# Patient Record
Sex: FEMALE | Race: ASIAN | Employment: UNEMPLOYED | URBAN - METROPOLITAN AREA
[De-identification: names, ages, dates, MRNs, and addresses within clinical notes are randomized per-mention and may not be internally consistent; named-entity substitution may affect disease eponyms.]

---

## 2019-10-30 ENCOUNTER — HOSPITAL ENCOUNTER (OUTPATIENT)
Age: 19
Discharge: HOME | End: 2019-10-30
Payer: MEDICAID

## 2019-10-30 ENCOUNTER — HOSPITAL ENCOUNTER (OUTPATIENT)
Dept: GENERAL RADIOLOGY | Age: 19
Discharge: HOME | End: 2019-10-30
Payer: MEDICAID

## 2019-10-30 DIAGNOSIS — R52 PAIN: ICD-10-CM

## 2019-10-30 PROCEDURE — 71046 X-RAY EXAM CHEST 2 VIEWS: CPT

## 2020-06-08 ENCOUNTER — APPOINTMENT (OUTPATIENT)
Dept: GENERAL RADIOLOGY | Age: 20
End: 2020-06-08
Payer: COMMERCIAL

## 2020-06-08 ENCOUNTER — HOSPITAL ENCOUNTER (EMERGENCY)
Age: 20
Discharge: HOME | End: 2020-06-08
Attending: EMERGENCY MEDICINE
Payer: COMMERCIAL

## 2020-06-08 VITALS
RESPIRATION RATE: 16 BRPM | SYSTOLIC BLOOD PRESSURE: 131 MMHG | DIASTOLIC BLOOD PRESSURE: 83 MMHG | HEIGHT: 58 IN | HEART RATE: 88 BPM | BODY MASS INDEX: 16.37 KG/M2 | WEIGHT: 78 LBS | OXYGEN SATURATION: 99 % | TEMPERATURE: 97.9 F

## 2020-06-08 LAB
ANION GAP SERPL CALCULATED.3IONS-SCNC: 9 MMOL/L (ref 3–16)
BASOPHILS ABSOLUTE: 0 K/UL (ref 0–0.2)
BASOPHILS RELATIVE PERCENT: 0.6 %
BUN BLDV-MCNC: 7 MG/DL (ref 7–20)
CALCIUM SERPL-MCNC: 9.7 MG/DL (ref 8.3–10.6)
CHLORIDE BLD-SCNC: 103 MMOL/L (ref 99–110)
CO2: 26 MMOL/L (ref 21–32)
CREAT SERPL-MCNC: <0.5 MG/DL (ref 0.6–1.1)
D DIMER: <200 NG/ML DDU (ref 0–229)
EOSINOPHILS ABSOLUTE: 0.2 K/UL (ref 0–0.6)
EOSINOPHILS RELATIVE PERCENT: 3.9 %
GFR AFRICAN AMERICAN: >60
GFR NON-AFRICAN AMERICAN: >60
GLUCOSE BLD-MCNC: 88 MG/DL (ref 70–99)
HCT VFR BLD CALC: 41.4 % (ref 36–48)
HEMOGLOBIN: 14 G/DL (ref 12–16)
LYMPHOCYTES ABSOLUTE: 1.8 K/UL (ref 1–5.1)
LYMPHOCYTES RELATIVE PERCENT: 35.7 %
MCH RBC QN AUTO: 29.4 PG (ref 26–34)
MCHC RBC AUTO-ENTMCNC: 33.8 G/DL (ref 31–36)
MCV RBC AUTO: 87.1 FL (ref 80–100)
MONOCYTES ABSOLUTE: 0.3 K/UL (ref 0–1.3)
MONOCYTES RELATIVE PERCENT: 6.6 %
NEUTROPHILS ABSOLUTE: 2.7 K/UL (ref 1.7–7.7)
NEUTROPHILS RELATIVE PERCENT: 53.2 %
PDW BLD-RTO: 12.4 % (ref 12.4–15.4)
PLATELET # BLD: 220 K/UL (ref 135–450)
PMV BLD AUTO: 9.1 FL (ref 5–10.5)
POTASSIUM REFLEX MAGNESIUM: 3.9 MMOL/L (ref 3.5–5.1)
RBC # BLD: 4.76 M/UL (ref 4–5.2)
SODIUM BLD-SCNC: 138 MMOL/L (ref 136–145)
TROPONIN: <0.01 NG/ML
WBC # BLD: 5 K/UL (ref 4–11)

## 2020-06-08 PROCEDURE — U0003 INFECTIOUS AGENT DETECTION BY NUCLEIC ACID (DNA OR RNA); SEVERE ACUTE RESPIRATORY SYNDROME CORONAVIRUS 2 (SARS-COV-2) (CORONAVIRUS DISEASE [COVID-19]), AMPLIFIED PROBE TECHNIQUE, MAKING USE OF HIGH THROUGHPUT TECHNOLOGIES AS DESCRIBED BY CMS-2020-01-R: HCPCS

## 2020-06-08 PROCEDURE — 85025 COMPLETE CBC W/AUTO DIFF WBC: CPT

## 2020-06-08 PROCEDURE — 80048 BASIC METABOLIC PNL TOTAL CA: CPT

## 2020-06-08 PROCEDURE — 71045 X-RAY EXAM CHEST 1 VIEW: CPT

## 2020-06-08 PROCEDURE — 99283 EMERGENCY DEPT VISIT LOW MDM: CPT

## 2020-06-08 PROCEDURE — 36415 COLL VENOUS BLD VENIPUNCTURE: CPT

## 2020-06-08 PROCEDURE — 85379 FIBRIN DEGRADATION QUANT: CPT

## 2020-06-08 PROCEDURE — 93005 ELECTROCARDIOGRAM TRACING: CPT | Performed by: NURSE PRACTITIONER

## 2020-06-08 PROCEDURE — 84484 ASSAY OF TROPONIN QUANT: CPT

## 2020-06-08 RX ORDER — AZITHROMYCIN 250 MG/1
TABLET, FILM COATED ORAL
Qty: 1 PACKET | Refills: 0 | Status: SHIPPED | OUTPATIENT
Start: 2020-06-08 | End: 2020-06-12

## 2020-06-08 ASSESSMENT — ENCOUNTER SYMPTOMS
COUGH: 1
NAUSEA: 0
DIARRHEA: 0
SORE THROAT: 0
BLOOD IN STOOL: 0
VOMITING: 0
RHINORRHEA: 0
CONSTIPATION: 0
ABDOMINAL PAIN: 0
SHORTNESS OF BREATH: 0

## 2020-06-08 ASSESSMENT — PAIN SCALES - GENERAL: PAINLEVEL_OUTOF10: 6

## 2020-06-08 NOTE — ED PROVIDER NOTES
[facility]        Pt Name: Fatemeh Crow  MRN: [id number]  [place] 2000  Date of evaluation: 6/8/2020  Provider: WILIAN Boss CNP  PCP: No primary care provider on file. This patient was not seen and evaluated by the attending physician No att. providers found. CHIEF COMPLAINT       Chief Complaint   Patient presents with    Cough     Pt reporting cough with pain in the right side of her chest when she coughs. Pt also c/o bruise to left knee of unknown origin. HISTORY OF PRESENT ILLNESS   (Location/Symptom, Timing/Onset,Context/Setting, Quality, Duration, Modifying Factors, Severity)  Note limiting factors. Fatemeh Crow is a 23 y.o. female presents emergency department with concern for chest pain. She reports is been present for 3 days somewhat constantly. She is tried no over-the-counter prescribed remedies for symptoms. She reports cough to nursing but denies it to me. She also denies fever, rash, headaches, dizziness, shortness of breath, congestion, abdominal pain, nausea, vomiting, diarrhea, constipation, blood in the stool, or painful urination. No family at bedside. Nursing Notes triage note reviewed and agreed with or any disagreements were addressed  in the HPI. REVIEW OF SYSTEMS    (2-9 systems for level 4, 10 or more for level 5)     Review of Systems   Constitutional: Negative for chills and fever. HENT: Negative for postnasal drip, rhinorrhea and sore throat. Eyes: Negative for visual disturbance. Respiratory: Positive for cough. Negative for shortness of breath. Cardiovascular: Positive for chest pain. Gastrointestinal: Negative for abdominal pain, blood in stool, constipation, diarrhea, nausea and vomiting. Genitourinary: Negative for dysuria, flank pain and hematuria. Skin: Negative for rash. Neurological: Negative for weakness and headaches. etiologies are considered. Recommend radiographic follow-up after treatment in 2-4 weeks to ensure resolution. PROCEDURES   Unless otherwise noted below, none     Procedures    CRITICAL CARE TIME     n/a    CONSULTS:  None      EMERGENCY DEPARTMENT COURSE and DIFFERENTIAL DIAGNOSIS/MDM:   Vitals:    Vitals:    06/08/20 1626   BP: 131/83   Pulse: 88   Resp: 16   Temp: 97.9 °F (36.6 °C)   SpO2: 99%   Weight: 78 lb (35.4 kg)   Height: 4' 10\" (1.473 m)       Irene Castillo was given the following medications:  Medications - No data to display    Irene Castillo was evaluated in the emergency department with concern for chest pain. Offered medication in the ER but declined it. D-dimer and troponin are negative. EKG is nonischemic. Chest x-ray showed concerns for possible pneumonia. She will be treated as an outpatient for this. I doubt emergent intrathoracic process including but not limited to coronary ischemia, myocardial infarction, cardiac tamponade, pulmonary embolism, thoracic aortic dissection, significant pericarditis, pneumothorax, or acute abdomen. COVID swab sent. This is pending. There is no evidence of sepsis, meningitis, epiglottitis bacterial , acute bacterial sinusitis, streptococcal pharyngitis, otitis media, or other bacterial infection that would be managed with antibiotics. The patient is tolerating PO without difficulty and is in no acute distress on exam.  BS clear to ascultation. Supportive care recommended at this time and the patient can be managed as an outpatient. Strict return precautions given for changing or worsening pain, trouble swallowing or breathing, neck stiffness, fever, or rash. The patient was specifically advised their symptoms are consistent with possible COVID-19 infection, and they need to self-isolate at home and restrict any activities outside of their home except for seeking medical care, and to wear a facemask whenever around others.   The patient

## 2020-06-08 NOTE — ED PROVIDER NOTES
Note that I was asked by the [facility] to perform an EKG interpretation I did not dissipate in the care of this patient. Ekg: EKG interpretation by ED physician: Normal axis, normal sinus rhythm at a rate of 73 bpm. No ischemic ST changes.        Farnaz Guerra DO  06/08/20 1932

## 2020-06-09 ENCOUNTER — CARE COORDINATION (OUTPATIENT)
Dept: FAMILY MEDICINE CLINIC | Age: 20
End: 2020-06-09

## 2020-06-09 LAB
EKG ATRIAL RATE: 73 BPM
EKG DIAGNOSIS: NORMAL
EKG P AXIS: 48 DEGREES
EKG P-R INTERVAL: 136 MS
EKG Q-T INTERVAL: 378 MS
EKG QRS DURATION: 72 MS
EKG QTC CALCULATION (BAZETT): 416 MS
EKG R AXIS: 78 DEGREES
EKG T AXIS: 47 DEGREES
EKG VENTRICULAR RATE: 73 BPM

## 2020-06-09 PROCEDURE — 93010 ELECTROCARDIOGRAM REPORT: CPT | Performed by: INTERNAL MEDICINE

## 2020-06-09 NOTE — CARE COORDINATION
[phone]  Based on Loop alert triggers, patient will be contacted by nurse care manager for worsening symptoms. Pt will be further monitored by COVID Loop Team based on severity of symptoms and risk factors.

## 2020-06-11 LAB
SARS-COV-2: NOT DETECTED
SOURCE: NORMAL

## 2021-02-17 ENCOUNTER — OFFICE VISIT - HEALTHEAST (OUTPATIENT)
Dept: FAMILY MEDICINE | Facility: CLINIC | Age: 21
End: 2021-02-17

## 2021-02-17 DIAGNOSIS — M25.50 MULTIPLE JOINT PAIN: ICD-10-CM

## 2021-02-17 DIAGNOSIS — L29.9 ITCHING: ICD-10-CM

## 2021-02-17 LAB
ALBUMIN SERPL-MCNC: 4.6 G/DL (ref 3.5–5)
ALP SERPL-CCNC: 39 U/L (ref 45–120)
ALT SERPL W P-5'-P-CCNC: 11 U/L (ref 0–45)
ANION GAP SERPL CALCULATED.3IONS-SCNC: 9 MMOL/L (ref 5–18)
AST SERPL W P-5'-P-CCNC: 15 U/L (ref 0–40)
BASOPHILS # BLD AUTO: 0 THOU/UL (ref 0–0.2)
BASOPHILS NFR BLD AUTO: 1 % (ref 0–2)
BILIRUB SERPL-MCNC: 1.4 MG/DL (ref 0–1)
BUN SERPL-MCNC: 10 MG/DL (ref 8–22)
CALCIUM SERPL-MCNC: 9.8 MG/DL (ref 8.5–10.5)
CHLORIDE BLD-SCNC: 103 MMOL/L (ref 98–107)
CO2 SERPL-SCNC: 28 MMOL/L (ref 22–31)
CREAT SERPL-MCNC: 0.84 MG/DL (ref 0.6–1.1)
EOSINOPHIL # BLD AUTO: 0.2 THOU/UL (ref 0–0.4)
EOSINOPHIL NFR BLD AUTO: 3 % (ref 0–6)
ERYTHROCYTE [DISTWIDTH] IN BLOOD BY AUTOMATED COUNT: 12.1 % (ref 11–14.5)
ERYTHROCYTE [SEDIMENTATION RATE] IN BLOOD BY WESTERGREN METHOD: 5 MM/HR (ref 0–20)
GFR SERPL CREATININE-BSD FRML MDRD: >60 ML/MIN/1.73M2
GLUCOSE BLD-MCNC: 75 MG/DL (ref 70–125)
HCT VFR BLD AUTO: 40.4 % (ref 35–47)
HGB BLD-MCNC: 13.9 G/DL (ref 12–16)
IMM GRANULOCYTES # BLD: 0 THOU/UL
IMM GRANULOCYTES NFR BLD: 0 %
LYMPHOCYTES # BLD AUTO: 2 THOU/UL (ref 0.8–4.4)
LYMPHOCYTES NFR BLD AUTO: 34 % (ref 20–40)
MCH RBC QN AUTO: 30.3 PG (ref 27–34)
MCHC RBC AUTO-ENTMCNC: 34.4 G/DL (ref 32–36)
MCV RBC AUTO: 88 FL (ref 80–100)
MONOCYTES # BLD AUTO: 0.3 THOU/UL (ref 0–0.9)
MONOCYTES NFR BLD AUTO: 6 % (ref 2–10)
NEUTROPHILS # BLD AUTO: 3.4 THOU/UL (ref 2–7.7)
NEUTROPHILS NFR BLD AUTO: 57 % (ref 50–70)
PLATELET # BLD AUTO: 214 THOU/UL (ref 140–440)
PMV BLD AUTO: 12.8 FL (ref 7–10)
POTASSIUM BLD-SCNC: 4 MMOL/L (ref 3.5–5)
PROT SERPL-MCNC: 8.2 G/DL (ref 6–8)
RBC # BLD AUTO: 4.59 MILL/UL (ref 3.8–5.4)
SODIUM SERPL-SCNC: 140 MMOL/L (ref 136–145)
TSH SERPL DL<=0.005 MIU/L-ACNC: 1.53 UIU/ML (ref 0.3–5)
WBC: 6 THOU/UL (ref 4–11)

## 2021-02-22 ENCOUNTER — COMMUNICATION - HEALTHEAST (OUTPATIENT)
Dept: FAMILY MEDICINE | Facility: CLINIC | Age: 21
End: 2021-02-22

## 2021-02-22 DIAGNOSIS — L29.9 ITCHING: ICD-10-CM

## 2021-02-22 RX ORDER — HYDROXYZINE PAMOATE 25 MG/1
25-50 CAPSULE ORAL
Qty: 90 CAPSULE | Refills: 1 | Status: SHIPPED | OUTPATIENT
Start: 2021-02-22 | End: 2022-02-02

## 2021-03-23 ENCOUNTER — OFFICE VISIT - HEALTHEAST (OUTPATIENT)
Dept: INTERNAL MEDICINE | Facility: CLINIC | Age: 21
End: 2021-03-23

## 2021-03-23 DIAGNOSIS — M25.50 POLYARTHRALGIA: ICD-10-CM

## 2021-03-23 DIAGNOSIS — K58.1 IRRITABLE BOWEL SYNDROME WITH CONSTIPATION: ICD-10-CM

## 2021-03-23 RX ORDER — DICYCLOMINE HCL 20 MG
20 TABLET ORAL 3 TIMES DAILY PRN
Qty: 30 TABLET | Refills: 0 | Status: SHIPPED | OUTPATIENT
Start: 2021-03-23 | End: 2022-02-02

## 2021-03-23 RX ORDER — GABAPENTIN 100 MG/1
100 CAPSULE ORAL 2 TIMES DAILY
Qty: 60 CAPSULE | Refills: 3 | Status: SHIPPED | OUTPATIENT
Start: 2021-03-23 | End: 2022-02-02

## 2021-03-23 ASSESSMENT — MIFFLIN-ST. JEOR: SCORE: 1003.09

## 2021-06-05 VITALS
SYSTOLIC BLOOD PRESSURE: 98 MMHG | HEIGHT: 58 IN | BODY MASS INDEX: 16.58 KG/M2 | HEART RATE: 103 BPM | DIASTOLIC BLOOD PRESSURE: 66 MMHG | OXYGEN SATURATION: 100 % | WEIGHT: 79 LBS

## 2021-06-05 VITALS
BODY MASS INDEX: 16.93 KG/M2 | HEART RATE: 80 BPM | TEMPERATURE: 98.4 F | OXYGEN SATURATION: 100 % | WEIGHT: 81 LBS | SYSTOLIC BLOOD PRESSURE: 112 MMHG | DIASTOLIC BLOOD PRESSURE: 58 MMHG

## 2021-06-15 NOTE — TELEPHONE ENCOUNTER
----- Message from Lorenzo Machuca MD sent at 2/19/2021  2:51 PM CST -----  Please call patient :   Normal labs     Consider medication ( hydroxyzine ) for itching to take at night   If agreed will send in the prescription

## 2021-06-15 NOTE — PROGRESS NOTES
Assessment & Plan     Multiple joint pain  - Erythrocyte Sedimentation Rate  Normal     Discussed a trial of therapy  - Ambulatory referral to PT/OT      Itching  Mainly at night     Plan:   - Thyroid Stimulating Hormone (TSH)  Normal    - HM1(CBC and Differential)  - Comprehensive Metabolic Panel  Normal      Consider medication ( hydroxyzine ) for itching to take at night            No follow-ups on file.    Lorenzo Machuca MD  [facility] Prabhakar is 20 y.o. female with no significant past medical history presenting with her significant other for having multiple joint pain mainly involving the right elbow and posterior right knee with radiation down to her calf for the past 8 to 10 years, progressively worsening without previous evaluation.  She denies past injuries to the joints and notes to having random pain reoccurring 2-3 times per week which may last all day and Tylenol or ibuprofen not helping as much.    She is also notes to be feeling itchy mainly at nighttime for the past year, with  No past evaluation for this condition.          Objective    /58 (Patient Position: Sitting)   Pulse 80   Temp 98.4  F (36.9  C)   Wt (!) 81 lb (36.7 kg)   LMP 02/11/2021   SpO2 100%   BMI 16.93 kg/m    Body mass index is 16.93 kg/m .  Physical Exam  General Appearance:    Alert, well hydrated, no distress,    Eyes:    conjunctiva/corneas clear,    Throat:   Lips, mucosa, and tongue normal; teeth and gums normal   Neck:   Supple, symmetrical, trachea midline, no adenopathy;        thyroid:  No enlargement/tenderness/nodules;    Lungs:     Clear to auscultation bilaterally, respirations unlabored   Heart:    Regular rate and rhythm, S1 and S2 normal, no murmur, rub   or gallop   Abdomen:     Soft, non-tender, normal bowel sounds, no rebound or guarding, no masses, no organomegaly   MSK/ Extremities:  Normal to inspection and atraumatic, no edema  Right  elbow: Normal to inspection with no swelling or erythema, full range of motion, palpable tenderness to the anterior antecubital  Right knee exam: Normal to inspection with no swelling, palpable tenderness to the posterior joint   Skin:  Normal skin color with no rashes or lesions

## 2021-06-15 NOTE — PATIENT INSTRUCTIONS - HE
1. Multiple joint pain  - Ambulatory referral to PT/OT  You'll be called to schedule an appointment.   - Erythrocyte Sedimentation Rate    2. Itching  - Thyroid Stimulating Hormone (TSH)  - HM1(CBC and Differential)  - Comprehensive Metabolic Panel

## 2021-06-16 NOTE — PROGRESS NOTES
Office Visit - Follow Up   Mellissa Prabhakar   21 y.o. female    Date of Visit: 3/23/2021    Chief Complaint   Patient presents with     Follow-up     muscle ache X 1 week all over body        Assessment and Plan   1. Polyarthralgia  Has been having muscle pains and some joint pains. Suspect she has fibromyalgia. Will treat with gabapentin. Will refer to rheumatology for definitive diagnosis.   - gabapentin (NEURONTIN) 100 MG capsule; Take 100 mg by mouth 2 (two) times a day.  Dispense: 60 capsule; Refill: 3  - Ambulatory referral to Rheumatology - [facility]    2. Irritable bowel syndrome with constipation  Also suffers from abdominal pains with constipation. Suspect she has IBS. Will treat with dicyclomine as needed.   - dicyclomine (BENTYL) 20 mg tablet; Take 1 tablet (20 mg total) by mouth 3 (three) times a day as needed.  Dispense: 30 tablet; Refill: 0      Follow up in  4 weeks.      History of Present Illness   This 21 y.o. old female new to me but was seen in February for muscle and joint pains. Was worked up by another MD with normal sed rate, CMP, thyroid and CBC. Complains of almost daily aches and pains involving multiple areas mostly her muscles. Also complains of abdominal pains with constipation. Nothing seems to precipitate her abdominal pains. Eats plant based diet and tries to drink more fluids. She is young and healthy.      Review of Systems   A 12 point comprehensive review of systems was negative except as noted..     Medications, Allergies and Problem List   Reviewed and updated             Chief Complaint   Follow-up (muscle ache X 1 week all over body)       Patient Profile   Social History     Social History Narrative     Not on file        Past Medical History   There is no problem list on file for this patient.      Past Surgical History  She has no past surgical history on file.       Medications and Allergies   Current Outpatient Medications   Medication Sig     hydrOXYzine pamoate  "(VISTARIL) 25 MG capsule Take 1-2 capsules (25-50 mg total) by mouth at bedtime as needed for itching.     ibuprofen (ADVIL,MOTRIN) 600 MG tablet Take 1 tablet (600 mg total) by mouth every 6 (six) hours as needed for pain.     dicyclomine (BENTYL) 20 mg tablet Take 1 tablet (20 mg total) by mouth 3 (three) times a day as needed.     gabapentin (NEURONTIN) 100 MG capsule Take 100 mg by mouth 2 (two) times a day.     No Known Allergies     Family and Social History   No family history on file.     Social History     Tobacco Use     Smoking status: Never Smoker     Smokeless tobacco: Never Used   Substance Use Topics     Alcohol use: Not on file     Drug use: Not on file        Physical Exam       Physical Exam  BP 98/66   Pulse (!) 103   Ht 4' 10\" (1.473 m)   Wt (!) 79 lb (35.8 kg)   SpO2 100%   BMI 16.51 kg/m    General appearance: alert, appears stated age, cooperative and no distress  Throat: lips, mucosa, and tongue normal; teeth and gums normal  Neck: no adenopathy, no carotid bruit, no JVD, supple, symmetrical, trachea midline and thyroid not enlarged, symmetric, no tenderness/mass/nodules  Lungs: clear to auscultation bilaterally  Heart: regular rate and rhythm, S1, S2 normal, no murmur, click, rub or gallop  Abdomen: soft, non-tender; bowel sounds normal; no masses,  no organomegaly  Extremities: extremities normal, atraumatic, no cyanosis or edema     Additional Information   Post Discharge Medication Reconciliation Status: unable to reconcile discharge medications due to not taking meds      Anthony Joiner MD  Internal Medicine  Contact me at [phone]     Additional Information   Current Outpatient Medications   Medication Sig     hydrOXYzine pamoate (VISTARIL) 25 MG capsule Take 1-2 capsules (25-50 mg total) by mouth at bedtime as needed for itching.     ibuprofen (ADVIL,MOTRIN) 600 MG tablet Take 1 tablet (600 mg total) by mouth every 6 (six) hours as needed for pain.     dicyclomine (BENTYL) " 20 mg tablet Take 1 tablet (20 mg total) by mouth 3 (three) times a day as needed.     gabapentin (NEURONTIN) 100 MG capsule Take 100 mg by mouth 2 (two) times a day.     No Known Allergies  Social History     Tobacco Use     Smoking status: Never Smoker     Smokeless tobacco: Never Used   Substance Use Topics     Alcohol use: Not on file     Drug use: Not on file

## 2021-06-21 NOTE — LETTER
Letter by Lorenzo Machuca MD at      Author: Lorenzo Machuca MD Service: -- Author Type: --    Filed:  Encounter Date: 2/22/2021 Status: (Other)         Mellissa Prabhakar  [address] [place] [address]             February 22, 2021         Dear Ms. Prabhakar,    Below are the results from your recent visit:     Normal     Resulted Orders   Thyroid Stimulating Hormone (TSH)   Result Value Ref Range    TSH 1.53 0.30 - 5.00 uIU/mL   Comprehensive Metabolic Panel   Result Value Ref Range    Sodium 140 136 - 145 mmol/L    Potassium 4.0 3.5 - 5.0 mmol/L    Chloride 103 98 - 107 mmol/L    CO2 28 22 - 31 mmol/L    Anion Gap, Calculation 9 5 - 18 mmol/L    Glucose 75 70 - 125 mg/dL    BUN 10 8 - 22 mg/dL    Creatinine 0.84 0.60 - 1.10 mg/dL    GFR MDRD Af Amer >60 >60 mL/min/1.73m2    GFR MDRD Non Af Amer >60 >60 mL/min/1.73m2    Bilirubin, Total 1.4 (H) 0.0 - 1.0 mg/dL    Calcium 9.8 8.5 - 10.5 mg/dL    Protein, Total 8.2 (H) 6.0 - 8.0 g/dL    Albumin 4.6 3.5 - 5.0 g/dL    Alkaline Phosphatase 39 (L) 45 - 120 U/L    AST 15 0 - 40 U/L    ALT 11 0 - 45 U/L    Narrative    Fasting Glucose reference range is 70-99 mg/dL per  American Diabetes Association (ADA) guidelines.   Erythrocyte Sedimentation Rate   Result Value Ref Range    Sed Rate 5 0 - 20 mm/hr   HM1 (CBC with Diff)   Result Value Ref Range    WBC 6.0 4.0 - 11.0 thou/uL    RBC 4.59 3.80 - 5.40 mill/uL    Hemoglobin 13.9 12.0 - 16.0 g/dL    Hematocrit 40.4 35.0 - 47.0 %    MCV 88 80 - 100 fL    MCH 30.3 27.0 - 34.0 pg    MCHC 34.4 32.0 - 36.0 g/dL    RDW 12.1 11.0 - 14.5 %    Platelets 214 140 - 440 thou/uL    MPV 12.8 (H) 7.0 - 10.0 fL    Neutrophils % 57 50 - 70 %    Lymphocytes % 34 20 - 40 %    Monocytes % 6 2 - 10 %    Eosinophils % 3 0 - 6 %    Basophils % 1 0 - 2 %    Immature Granulocyte % 0 <=0 %    Neutrophils Absolute 3.4 2.0 - 7.7 thou/uL    Lymphocytes Absolute 2.0 0.8 - 4.4 thou/uL    Monocytes Absolute 0.3 0.0 - 0.9 thou/uL     Eosinophils Absolute 0.2 0.0 - 0.4 thou/uL    Basophils Absolute 0.0 0.0 - 0.2 thou/uL    Immature Granulocyte Absolute 0.0 <=0.0 thou/uL         Please call with questions or contact us using [employer].    Sincerely,        Electronically signed by Lorenzo Machuca MD

## 2021-06-21 NOTE — LETTER
Letter by Lorenzo Machuca MD at      Author: Lorenzo Machuca MD Service: -- Author Type: --    Filed:  Encounter Date: 2/22/2021 Status: (Other)         Mellissa Prabhakar  [address] [place] [address]             February 22, 2021         Dear Ms. Prabhakar,    Below are the results from your recent visit:   Normal    Resulted Orders   Thyroid Stimulating Hormone (TSH)   Result Value Ref Range    TSH 1.53 0.30 - 5.00 uIU/mL   Comprehensive Metabolic Panel   Result Value Ref Range    Sodium 140 136 - 145 mmol/L    Potassium 4.0 3.5 - 5.0 mmol/L    Chloride 103 98 - 107 mmol/L    CO2 28 22 - 31 mmol/L    Anion Gap, Calculation 9 5 - 18 mmol/L    Glucose 75 70 - 125 mg/dL    BUN 10 8 - 22 mg/dL    Creatinine 0.84 0.60 - 1.10 mg/dL    GFR MDRD Af Amer >60 >60 mL/min/1.73m2    GFR MDRD Non Af Amer >60 >60 mL/min/1.73m2    Bilirubin, Total 1.4 (H) 0.0 - 1.0 mg/dL    Calcium 9.8 8.5 - 10.5 mg/dL    Protein, Total 8.2 (H) 6.0 - 8.0 g/dL    Albumin 4.6 3.5 - 5.0 g/dL    Alkaline Phosphatase 39 (L) 45 - 120 U/L    AST 15 0 - 40 U/L    ALT 11 0 - 45 U/L    Narrative    Fasting Glucose reference range is 70-99 mg/dL per  American Diabetes Association (ADA) guidelines.   Erythrocyte Sedimentation Rate   Result Value Ref Range    Sed Rate 5 0 - 20 mm/hr   HM1 (CBC with Diff)   Result Value Ref Range    WBC 6.0 4.0 - 11.0 thou/uL    RBC 4.59 3.80 - 5.40 mill/uL    Hemoglobin 13.9 12.0 - 16.0 g/dL    Hematocrit 40.4 35.0 - 47.0 %    MCV 88 80 - 100 fL    MCH 30.3 27.0 - 34.0 pg    MCHC 34.4 32.0 - 36.0 g/dL    RDW 12.1 11.0 - 14.5 %    Platelets 214 140 - 440 thou/uL    MPV 12.8 (H) 7.0 - 10.0 fL    Neutrophils % 57 50 - 70 %    Lymphocytes % 34 20 - 40 %    Monocytes % 6 2 - 10 %    Eosinophils % 3 0 - 6 %    Basophils % 1 0 - 2 %    Immature Granulocyte % 0 <=0 %    Neutrophils Absolute 3.4 2.0 - 7.7 thou/uL    Lymphocytes Absolute 2.0 0.8 - 4.4 thou/uL    Monocytes Absolute 0.3 0.0 - 0.9 thou/uL     Eosinophils Absolute 0.2 0.0 - 0.4 thou/uL    Basophils Absolute 0.0 0.0 - 0.2 thou/uL    Immature Granulocyte Absolute 0.0 <=0.0 thou/uL           Please call with questions or contact us using [employer].    Sincerely,        Electronically signed by Lorenzo Machuca MD

## 2021-06-21 NOTE — LETTER
Letter by Lorenzo Machuca MD at      Author: Lorenzo Machuca MD Service: -- Author Type: --    Filed:  Encounter Date: 2/22/2021 Status: (Other)         Mellissa Prabhakar  [address]  [place] [address]             February 22, 2021         Dear Ms. Prabhakar,    Below are the results from your recent visit:   Normal labs     Resulted Orders   Thyroid Stimulating Hormone (TSH)   Result Value Ref Range    TSH 1.53 0.30 - 5.00 uIU/mL   Comprehensive Metabolic Panel   Result Value Ref Range    Sodium 140 136 - 145 mmol/L    Potassium 4.0 3.5 - 5.0 mmol/L    Chloride 103 98 - 107 mmol/L    CO2 28 22 - 31 mmol/L    Anion Gap, Calculation 9 5 - 18 mmol/L    Glucose 75 70 - 125 mg/dL    BUN 10 8 - 22 mg/dL    Creatinine 0.84 0.60 - 1.10 mg/dL    GFR MDRD Af Amer >60 >60 mL/min/1.73m2    GFR MDRD Non Af Amer >60 >60 mL/min/1.73m2    Bilirubin, Total 1.4 (H) 0.0 - 1.0 mg/dL    Calcium 9.8 8.5 - 10.5 mg/dL    Protein, Total 8.2 (H) 6.0 - 8.0 g/dL    Albumin 4.6 3.5 - 5.0 g/dL    Alkaline Phosphatase 39 (L) 45 - 120 U/L    AST 15 0 - 40 U/L    ALT 11 0 - 45 U/L    Narrative    Fasting Glucose reference range is 70-99 mg/dL per  American Diabetes Association (ADA) guidelines.   Erythrocyte Sedimentation Rate   Result Value Ref Range    Sed Rate 5 0 - 20 mm/hr   HM1 (CBC with Diff)   Result Value Ref Range    WBC 6.0 4.0 - 11.0 thou/uL    RBC 4.59 3.80 - 5.40 mill/uL    Hemoglobin 13.9 12.0 - 16.0 g/dL    Hematocrit 40.4 35.0 - 47.0 %    MCV 88 80 - 100 fL    MCH 30.3 27.0 - 34.0 pg    MCHC 34.4 32.0 - 36.0 g/dL    RDW 12.1 11.0 - 14.5 %    Platelets 214 140 - 440 thou/uL    MPV 12.8 (H) 7.0 - 10.0 fL    Neutrophils % 57 50 - 70 %    Lymphocytes % 34 20 - 40 %    Monocytes % 6 2 - 10 %    Eosinophils % 3 0 - 6 %    Basophils % 1 0 - 2 %    Immature Granulocyte % 0 <=0 %    Neutrophils Absolute 3.4 2.0 - 7.7 thou/uL    Lymphocytes Absolute 2.0 0.8 - 4.4 thou/uL    Monocytes Absolute 0.3 0.0 - 0.9 thou/uL     Eosinophils Absolute 0.2 0.0 - 0.4 thou/uL    Basophils Absolute 0.0 0.0 - 0.2 thou/uL    Immature Granulocyte Absolute 0.0 <=0.0 thou/uL           Please call with questions or contact us using [employer].    Sincerely,        Electronically signed by Lorenzo Machuca MD

## 2021-06-30 ENCOUNTER — OFFICE VISIT - HEALTHEAST (OUTPATIENT)
Dept: FAMILY MEDICINE | Facility: CLINIC | Age: 21
End: 2021-06-30

## 2021-06-30 DIAGNOSIS — M79.672 LEFT FOOT PAIN: ICD-10-CM

## 2021-06-30 DIAGNOSIS — M79.672 PAIN IN LEFT FOOT: ICD-10-CM

## 2021-06-30 DIAGNOSIS — M25.572 PAIN IN JOINT INVOLVING ANKLE AND FOOT, LEFT: ICD-10-CM

## 2021-06-30 DIAGNOSIS — M25.572 PAIN IN LEFT ANKLE AND JOINTS OF LEFT FOOT: ICD-10-CM

## 2021-07-04 NOTE — PATIENT INSTRUCTIONS - HE
Patient Instructions by Riley Lipscomb PA-C at 6/30/2021 12:15 PM     Author: Riley Lipscomb PA-C Service: -- Author Type: Physician Assistant    Filed: 6/30/2021  1:14 PM Encounter Date: 6/30/2021 Status: Addendum    : Riley Lipscomb PA-C (Physician Assistant)    Related Notes: Original Note by Riley Lipscomb PA-C (Physician Assistant) filed at 6/30/2021  1:14 PM       Nonweightbearing or protected weightbearing as needed  Elevate extremity above the level of the heart as much as possible especially on the first 2 days.  For the first 2 days ice the ankle 20 minutes on each hour while awake avoiding and taking precautions to damage the skin  Use of ankle height walking boot  You may be out of the boot for sleep and hygiene and when not up and active.  Session out of the boot after 1 week.  Over-the-counter ibuprofen 600 mg 3 times a day with food for analgesia and anti-inflammatory effect as long as there is no contraindication from the medications.  General risks and benefits of the medication were gone over  Full resolution will be over the next 4-6 weeks but there should be the worst symptoms and progression of healing should be over the first 10-12 days.  If inability to bear weight, worsening swelling or effusion of the joint or ecchymoses return to clinic and for evaluation with orthopedics for rexray in two weeks.          Patient Education     Understanding Ankle Sprain    The ankle is the joint where the leg and foot meet. Bones are held in place by connective tissue called ligaments. When ankle ligaments are stretched to the point of pain and injury, it's called an ankle sprain. A sprain can tear the ligaments. These tears can be very small but still cause pain. Ankle sprains are graded by the amount of ligament damage.    Grade 1 (mild). There is slight stretching and tiny tears to the ligament fibers. You may have mild ankle swelling and tenderness.    Grade 2 (moderate). This is a partial ligament  tear and causes moderate ankle swelling and tenderness. There may be abnormal looseness when the healthcare provider moves your joint.    Grade 3 (severe). There is a complete tear to the ligament and a great deal of ankle swelling and tenderness. The ankle joint may be very unstable.  What causes an ankle sprain?  A sprain may occur when you twist your ankle or bend it too far. This can happen when you stumble or fall. Things that can make an ankle sprain more likely include:    Having had an ankle sprain before    Playing sports that involve running and jumping. Or playing contact sports such as football or hockey.    Wearing shoes that dont support your feet and ankles well    Having ankles with poor strength and flexibility  Symptoms of an ankle sprain  Symptoms may include:    Pain or soreness in the ankle    Swelling    Redness or bruising    Not being able to walk or put weight on the affected foot    Reduced range of motion in the ankle    A popping or tearing feeling at the time the sprain occurs    An abnormal or dislocated look to the ankle    Instability or too much range of motion in the ankle  Treatment for an ankle sprain  Treatment focuses on reducing pain and swelling, and preventing further injury. Treatments may include:    Resting the ankle. Avoid putting weight on it. This may mean using crutches until the sprain heals.    Prescription or over-the-counter medicines. These help reduce swelling and pain.    Cold packs. These help reduce pain and swelling.    Raising your ankle above your heart. This helps reduce swelling.    Wrapping the ankle with an elastic bandage or ankle brace. This helps reduce swelling and gives some support to the ankle. In rare cases, you may need a cast or boot.    Stretching and other exercises. These improve flexibility and strength.    Heat packs. These may be recommended before doing ankle exercises.  Possible complications of an ankle sprain  An ankle that has been  weakened by a sprain can be more likely to have repeated sprains afterward. Doing exercises to strengthen your ankle and improve balance can reduce your risk for repeated sprains. Other possible complications are long-term (chronic) pain or an ankle that remains unstable.  When to call your healthcare provider  Call your healthcare provider right away if you have any of these:    Fever of 100.4 F (38 C) or higher, or as directed by your provider    Chills    Pain, numbness, discoloration, or coldness in the foot or toes    Pain that gets worse    Symptoms that dont get better, or get worse    New symptoms  Date Last Reviewed: 3/10/2016    7973-2266 [employer]. [address] [place] [address], [place] [address]. All rights reserved. This information is not intended as a substitute for professional medical care. Always follow your healthcare professional's instructions.           Patient Education     Treating Ankle Sprains  Treatment will depend on how bad your sprain is. For a severe sprain, healing may take 3 months or more.  Right after your injury: Use R.I.C.E.    BIG: Rest: At first, keep weight off the ankle as much as you can. You may be given crutches to help you walk without putting weight on the ankle.    BIG: Ice: Put an ice pack on the ankle for 20 minutes. Remove the pack and wait at least 30 minutes. Repeat for up to 3 days. This helps reduce swelling.    BIG: Compression: To reduce swelling and keep the joint stable, you may need to wrap the ankle with an elastic bandage. For more severe sprains, you may need an ankle brace, a boot, or a cast.    BIG: Elevation: To reduce swelling, keep your ankle raised above your heart when you sit or lie down.  Medicine  Your healthcare provider may suggest oral nonsteroidal anti-inflammatory medicine (NSAIDs), such as ibuprofen. This relieves the pain and helps reduce swelling. Be sure to take your medicine as directed.  Exercises    After about 2 to 3 weeks,  you may be given exercises to strengthen the ligaments and muscles in the ankle. Doing these exercises will help prevent another ankle sprain. Exercises may include standing on your toes and then on your heels and doing ankle curls.    Sit on the edge of a sturdy table or lie on your back.    Pull your toes toward you. Then point them away from you. Repeat for 2 to 3 minutes.  Date Last Reviewed: 1/1/2018 2000-2019 [employer] [address] [place], [place] [id number]. All rights reserved. This information is not intended as a substitute for professional medical care. Always follow your healthcare professional's instructions.

## 2021-07-04 NOTE — PROGRESS NOTES
Progress Notes by Riley Lipscomb PA-C at 6/30/2021 12:15 PM     Author: Riley Lipscomb PA-C Service: -- Author Type: Physician Assistant    Filed: 6/30/2021  2:29 PM Encounter Date: 6/30/2021 Status: Signed    : Riley Lipscomb PA-C (Physician Assistant)       Chief Complaint   Patient presents with   ? Fall     Fell 6/29/21, L ankle and foot pain, able to bear weight with pain         Clinical Decision Making:  Conversation with the patient stating I do think that she had problems with a contusion to the ankle or the lateral aspect of the foot and that there was a possible ankle sprain.  Use of the cam walker boot because the patient did not want to weight-bear on it as it was painful.  May transition out of the cam walker boot after 1 week.  Should be in the boot more than she is out of it the first week and then out of it after a week.  Out of the boot for sleep hygiene and when not up and active.  All suggestions and treatment plan and return in 2 weeks for audelia-ray and reevaluation if not getting 100% resolution or if new symptoms or concerns arise.    Patient requested a note to be off for work for 1 week.  Advised the patient that we can only write restrictions for what is safe for her to do at home and at work.  They have no work for her to do at her place of employment that is safe they may either have her sit on the job site or go home.  Patient will follow up with her primary care provider if any FMLA or disability paperwork is needed.  I informed her we do not fill that out through the urgent care.  She voiced understanding of that concern.    At the end of the encounter, I discussed results, diagnosis, medications. Discussed red flags for immediate return to clinic/ER, as well as indications for follow up if no improvement. Patient understood and agreed to plan. Patient was stable for discharge.      1. Left foot pain  XR Foot Left 2 VWS    Ankle/Foot DME: Walking Boot; Left; Non-pneumatic   2. Pain in  joint involving ankle and foot, left  XR Ankle Left 3 or More VWS    Ankle/Foot DME: Walking Boot; Left; Non-pneumatic         Patient Instructions   Nonweightbearing or protected weightbearing as needed  Elevate extremity above the level of the heart as much as possible especially on the first 2 days.  For the first 2 days ice the ankle 20 minutes on each hour while awake avoiding and taking precautions to damage the skin  Use of ankle height walking boot  You may be out of the boot for sleep and hygiene and when not up and active.  Session out of the boot after 1 week.  Over-the-counter ibuprofen 600 mg 3 times a day with food for analgesia and anti-inflammatory effect as long as there is no contraindication from the medications.  General risks and benefits of the medication were gone over  Full resolution will be over the next 4-6 weeks but there should be the worst symptoms and progression of healing should be over the first 10-12 days.  If inability to bear weight, worsening swelling or effusion of the joint or ecchymoses return to clinic and for evaluation with orthopedics for rexray in two weeks.          Patient Education     Understanding Ankle Sprain    The ankle is the joint where the leg and foot meet. Bones are held in place by connective tissue called ligaments. When ankle ligaments are stretched to the point of pain and injury, it's called an ankle sprain. A sprain can tear the ligaments. These tears can be very small but still cause pain. Ankle sprains are graded by the amount of ligament damage.    Grade 1 (mild). There is slight stretching and tiny tears to the ligament fibers. You may have mild ankle swelling and tenderness.    Grade 2 (moderate). This is a partial ligament tear and causes moderate ankle swelling and tenderness. There may be abnormal looseness when the healthcare provider moves your joint.    Grade 3 (severe). There is a complete tear to the ligament and a great deal of ankle  swelling and tenderness. The ankle joint may be very unstable.  What causes an ankle sprain?  A sprain may occur when you twist your ankle or bend it too far. This can happen when you stumble or fall. Things that can make an ankle sprain more likely include:    Having had an ankle sprain before    Playing sports that involve running and jumping. Or playing contact sports such as football or hockey.    Wearing shoes that dont support your feet and ankles well    Having ankles with poor strength and flexibility  Symptoms of an ankle sprain  Symptoms may include:    Pain or soreness in the ankle    Swelling    Redness or bruising    Not being able to walk or put weight on the affected foot    Reduced range of motion in the ankle    A popping or tearing feeling at the time the sprain occurs    An abnormal or dislocated look to the ankle    Instability or too much range of motion in the ankle  Treatment for an ankle sprain  Treatment focuses on reducing pain and swelling, and preventing further injury. Treatments may include:    Resting the ankle. Avoid putting weight on it. This may mean using crutches until the sprain heals.    Prescription or over-the-counter medicines. These help reduce swelling and pain.    Cold packs. These help reduce pain and swelling.    Raising your ankle above your heart. This helps reduce swelling.    Wrapping the ankle with an elastic bandage or ankle brace. This helps reduce swelling and gives some support to the ankle. In rare cases, you may need a cast or boot.    Stretching and other exercises. These improve flexibility and strength.    Heat packs. These may be recommended before doing ankle exercises.  Possible complications of an ankle sprain  An ankle that has been weakened by a sprain can be more likely to have repeated sprains afterward. Doing exercises to strengthen your ankle and improve balance can reduce your risk for repeated sprains. Other possible complications are long-term  (chronic) pain or an ankle that remains unstable.  When to call your healthcare provider  Call your healthcare provider right away if you have any of these:    Fever of 100.4 F (38 C) or higher, or as directed by your provider    Chills    Pain, numbness, discoloration, or coldness in the foot or toes    Pain that gets worse    Symptoms that dont get better, or get worse    New symptoms  Date Last Reviewed: 3/10/2016    5518-9546 [employer]. [address] [place] [address]. All rights reserved. This information is not intended as a substitute for professional medical care. Always follow your healthcare professional's instructions.           Patient Education     Treating Ankle Sprains  Treatment will depend on how bad your sprain is. For a severe sprain, healing may take 3 months or more.  Right after your injury: Use R.I.C.E.    BIG: Rest: At first, keep weight off the ankle as much as you can. You may be given crutches to help you walk without putting weight on the ankle.    BIG: Ice: Put an ice pack on the ankle for 20 minutes. Remove the pack and wait at least 30 minutes. Repeat for up to 3 days. This helps reduce swelling.    BIG: Compression: To reduce swelling and keep the joint stable, you may need to wrap the ankle with an elastic bandage. For more severe sprains, you may need an ankle brace, a boot, or a cast.    BIG: Elevation: To reduce swelling, keep your ankle raised above your heart when you sit or lie down.  Medicine  Your healthcare provider may suggest oral nonsteroidal anti-inflammatory medicine (NSAIDs), such as ibuprofen. This relieves the pain and helps reduce swelling. Be sure to take your medicine as directed.  Exercises    After about 2 to 3 weeks, you may be given exercises to strengthen the ligaments and muscles in the ankle. Doing these exercises will help prevent another ankle sprain. Exercises may include standing on your toes and then on your heels and doing  ankle curls.    Sit on the edge of a sturdy table or lie on your back.    Pull your toes toward you. Then point them away from you. Repeat for 2 to 3 minutes.  Date Last Reviewed: 1/1/2018 2000-2019 [employer]. [address] [place] [address]. All rights reserved. This information is not intended as a substitute for professional medical care. Always follow your healthcare professional's instructions.                HPI:  Mellissa Prabhakar is a 21 y.o. female who presents today for evaluation of left ankle and foot pain.  Patient shares that she fell from a standing height on 6/29/2021.  She hit the left lateral malleoli and the left lateral foot.  She had no break in the skin no ecchymoses no bleeding.  No joint effusion.  Says it is painful to bear weight.  However, the patient has been walking and did walk into the office encounter with an antalgic gait.  She has not tried treatment for this.  Specifically she denies any hip pain of the ipsilateral side contralateral right lower extremity or head neck back upper extremities or pelvic injury or pain to report.  She has not tried treatment for this at home.    History obtained from the patient.    Problem List:  There are no relevant problems documented for this patient.      No past medical history on file.    Social History     Tobacco Use   ? Smoking status: Never Smoker   ? Smokeless tobacco: Never Used   Substance Use Topics   ? Alcohol use: Not on file       Review of Systems  As above in HPI, otherwise balance of Review of Systems are negative.    Vitals:    06/30/21 1215   BP: 110/71   Patient Site: Right Arm   Patient Position: Sitting   Cuff Size: Adult Small   Pulse: 80   Temp: 98.3  F (36.8  C)   TempSrc: Oral   SpO2: 99%   Weight: (!) 76 lb 5 oz (34.6 kg)       Physical Exam    General: Patient is resting comfortably no acute distress is afebrile  HEENT: Head is normocephalic atraumatic   eyes are PERRL EOMI sclera  anicteric   Skin: Without rash non-diaphoretic  Musculoskeletal: Patient has point of maximal tenderness to palpation over the lateral malleoli of the distal fibula.  She also has some pain at the medial malleoli.  Positive squeeze test at the distal ankle.  No noted joint effusion ecchymoses or break in the skin.  She does have pain over the cuboid bone of the lateral tarsals and metatarsals.  No pain over Lisfranc ligament or plantar surface of the foot ecchymoses.  Patient is fully weightbearing and walking into the encounter but does have an antalgic gait.  Ankle joint is again without effusion the knee and hip are nontender palpation full range of motion without effusion on the left side.    Radiology:  I have personally ordered and preliminarily reviewed the following xray, I have noted no joint effusions or fractures.    Xr Ankle Left 3 Or More Vws    Result Date: 6/30/2021  EXAM DATE:         06/30/2021 EXAM: X-RAY ANKLE LEFT, MINIMUM THREE VIEWS, X-RAY FOOT LEFT, AP AND LATERAL LOCATION: [facility] DATE/TIME: 6/30/2021 12:45 PM INDICATION: Distal fibula pain with positive squeeze test COMPARISON: None. IMPRESSION: Normal joint spaces and alignment. No fracture of the foot or ankle.     Xr Foot Left 2 Vws    Result Date: 6/30/2021  EXAM DATE:         06/30/2021 EXAM: X-RAY ANKLE LEFT, MINIMUM THREE VIEWS, X-RAY FOOT LEFT, AP AND LATERAL LOCATION: [facility] DATE/TIME: 6/30/2021 12:45 PM INDICATION: Distal fibula pain with positive squeeze test COMPARISON: None. IMPRESSION: Normal joint spaces and alignment. No fracture of the foot or ankle.

## 2021-07-06 VITALS
WEIGHT: 76.31 LBS | OXYGEN SATURATION: 99 % | BODY MASS INDEX: 15.95 KG/M2 | DIASTOLIC BLOOD PRESSURE: 71 MMHG | HEART RATE: 80 BPM | TEMPERATURE: 98.3 F | SYSTOLIC BLOOD PRESSURE: 110 MMHG

## 2021-07-22 NOTE — LETTER
Letter by Riley Lipscomb PA-C at      Author: Riley Lipscomb PA-C Service: -- Author Type: --    Filed:  Encounter Date: 6/30/2021 Status: (Other)         June 30, 2021     Patient: Mellissa Prabhakar   YOB: 2000   Date of Visit: 6/30/2021       To Whom It May Concern:    It is my medical opinion that Mellissa Prabhakar may return to light duty immediately with the following restrictions: wear cam walker boot for one week. No crawling, climbing ladders for one week.  May return to full duty on July 7.      If you have any questions or concerns, please don't hesitate to call.    Sincerely,        Electronically signed by Riley Lipscomb PA-C

## 2021-07-22 NOTE — LETTER
Letter by Riley Lipscomb PA-C at      Author: Riley Lipscomb PA-C Service: -- Author Type: --    Filed:  Encounter Date: 6/30/2021 Status: (Other)         June 30, 2021     Patient: Mellissa Prabhakar   YOB: 2000   Date of Visit: 6/30/2021       To Whom It May Concern:    It is my medical opinion that Mellissa Prabhakar may return to work on 07/01/2021.    If you have any questions or concerns, please don't hesitate to call.    Sincerely,        Electronically signed by Riley Lipscomb PA-C

## 2021-10-13 ENCOUNTER — HOSPITAL ENCOUNTER (EMERGENCY)
Facility: HOSPITAL | Age: 21
Discharge: HOME | End: 2021-10-13
Attending: EMERGENCY MEDICINE | Admitting: EMERGENCY MEDICINE
Payer: COMMERCIAL

## 2021-10-13 VITALS
WEIGHT: 79 LBS | DIASTOLIC BLOOD PRESSURE: 88 MMHG | SYSTOLIC BLOOD PRESSURE: 122 MMHG | TEMPERATURE: 97.7 F | BODY MASS INDEX: 16.58 KG/M2 | HEIGHT: 58 IN | RESPIRATION RATE: 18 BRPM | HEART RATE: 93 BPM | OXYGEN SATURATION: 99 %

## 2021-10-13 DIAGNOSIS — B37.31 CANDIDIASIS OF VAGINA: ICD-10-CM

## 2021-10-13 DIAGNOSIS — B96.89 BACTERIAL VAGINOSIS: ICD-10-CM

## 2021-10-13 DIAGNOSIS — N76.0 BACTERIAL VAGINOSIS: ICD-10-CM

## 2021-10-13 LAB
ALBUMIN UR-MCNC: NEGATIVE MG/DL
APPEARANCE UR: CLEAR
BACTERIA #/AREA URNS HPF: ABNORMAL /HPF
BILIRUB UR QL STRIP: NEGATIVE
CLUE CELLS: PRESENT
COLOR UR AUTO: ABNORMAL
GLUCOSE UR STRIP-MCNC: NEGATIVE MG/DL
HGB UR QL STRIP: ABNORMAL
KETONES UR STRIP-MCNC: NEGATIVE MG/DL
LEUKOCYTE ESTERASE UR QL STRIP: ABNORMAL
NITRATE UR QL: NEGATIVE
PH UR STRIP: 6 [PH] (ref 5–7)
RBC URINE: 4 /HPF
SP GR UR STRIP: 1.01 (ref 1–1.03)
SQUAMOUS EPITHELIAL: 2 /HPF
TRICHOMONAS, WET PREP: ABNORMAL
UROBILINOGEN UR STRIP-MCNC: <2 MG/DL
WBC URINE: 7 /HPF
WBC'S/HIGH POWER FIELD, WET PREP: ABNORMAL
YEAST, WET PREP: PRESENT

## 2021-10-13 PROCEDURE — 81001 URINALYSIS AUTO W/SCOPE: CPT | Performed by: STUDENT IN AN ORGANIZED HEALTH CARE EDUCATION/TRAINING PROGRAM

## 2021-10-13 PROCEDURE — 81001 URINALYSIS AUTO W/SCOPE: CPT | Performed by: EMERGENCY MEDICINE

## 2021-10-13 PROCEDURE — 87210 SMEAR WET MOUNT SALINE/INK: CPT | Performed by: EMERGENCY MEDICINE

## 2021-10-13 PROCEDURE — 99284 EMERGENCY DEPT VISIT MOD MDM: CPT

## 2021-10-13 PROCEDURE — 87086 URINE CULTURE/COLONY COUNT: CPT | Performed by: EMERGENCY MEDICINE

## 2021-10-13 RX ORDER — CLOTRIMAZOLE 1 %
1 CREAM WITH APPLICATOR VAGINAL AT BEDTIME
Qty: 1 G | Refills: 0 | Status: SHIPPED | OUTPATIENT
Start: 2021-10-13 | End: 2021-10-20

## 2021-10-13 ASSESSMENT — MIFFLIN-ST. JEOR: SCORE: 1003.09

## 2021-10-13 NOTE — ED TRIAGE NOTES
Pt arrives with s/o. Pt c/o vaginal burning, itching and discharge x 3 days. Denies recent antibiotic use.

## 2021-10-14 LAB — BACTERIA UR CULT: NO GROWTH

## 2021-10-14 NOTE — ED PROVIDER NOTES
EMERGENCY DEPARTMENT ENCOUNTER            IMPRESSION:  Bacterial vaginosis  Vaginal candidiasis      MEDICAL DECISION MAKING:  Patient evaluated for vaginal discharge and itching.  She has very mild left pelvic discomfort.  No bleeding or pregnancy concerns    On exam her vital signs are normal.  Some slight tenderness with deep palpation of the left transabdominal adnexa    Wet prep showed presence of yeast and clue cells.    Patient will be discharged home with treatment for BV and yeast infection.          =================================================================  CHIEF COMPLAINT:  Chief Complaint   Patient presents with     Vaginal Itching     Dysuria         HPI  Mellissa Prabhakar is a 21 year old female with no pertinent history on record who presents to the ED by walk-in for evaluation of vaginal discharge and itching.    Patient reports vaginal discharge, itching, and burning which began 3 days ago. Also reports mild left lower quadrant abdominal pain for the past 3 days. She has been taking ibuprofen with mild relief. Patient denies nausea, vomiting, vaginal bleeding. She is not sexually active, per triage note. No other complaints or concerns expressed at this time.      I, Zoran May am serving as a scribe to document services personally performed by Dr. Jeremy Colbert MD, based on my observation and the provider's statements to me. I, Dr. Jeremy Colbert MD attest that Zoran May  is acting in a scribe capacity, has observed my performance of the services and has documented them in accordance with my direction.      REVIEW OF SYSTEMS   Constitutional: Denies fever, chills, unintentional weight loss or fatigue   Eyes: Denies visual changes or discharge    HENT: Denies sore throat, ear pain or neck pain  Respiratory: Denies cough or shortness of breath    Cardiovascular: Denies chest pain, palpitations or leg swelling  GI: Denies nausea, vomiting, or dark, bloody stools. Positive for left lower  abdominal pain.  : Denies hematuria, dysuria, flank pain, vaginal bleeding. Positive for vaginal discharge, vaginal itching.   Musculoskeletal: Denies any new back pain or new muscle/joint pains  Skin: Denies rash or wound  Neurologic: Denies current headache, new weakness, focal weakness, or sensory changes    Lymphatic: Denies swollen glands    Psychiatric: Denies depression, suicidal ideation or homicidal ideation.    Remainder of systems reviewed, unless noted in HPI all others negative.      PAST MEDICAL HISTORY:  History reviewed. No pertinent past medical history.    PAST SURGICAL HISTORY:  History reviewed. No pertinent surgical history.      CURRENT MEDICATIONS:    clindamycin (CLEOCIN 1 DOSE) 2 % vaginal cream  clotrimazole (LOTRIMIN) 1 % vaginal cream  dicyclomine (BENTYL) 20 mg tablet  gabapentin (NEURONTIN) 100 MG capsule  hydrocortisone 2.5 % cream  hydrOXYzine pamoate (VISTARIL) 25 MG capsule  ibuprofen (ADVIL,MOTRIN) 600 MG tablet        ALLERGIES:  No Known Allergies    FAMILY HISTORY:  History reviewed. No pertinent family history.    SOCIAL HISTORY:   Social History     Socioeconomic History     Marital status: Single     Spouse name: Not on file     Number of children: Not on file     Years of education: Not on file     Highest education level: Not on file   Occupational History     Not on file   Tobacco Use     Smoking status: Never Smoker     Smokeless tobacco: Never Used   Substance and Sexual Activity     Alcohol use: Not on file     Drug use: Not on file     Sexual activity: Not on file   Other Topics Concern     Not on file   Social History Narrative     Not on file     Social Determinants of Health     Financial Resource Strain:      Difficulty of Paying Living Expenses:    Food Insecurity:      Worried About Running Out of Food in the Last Year:      Ran Out of Food in the Last Year:    Transportation Needs:      Lack of Transportation (Medical):      Lack of Transportation  "(Non-Medical):    Physical Activity:      Days of Exercise per Week:      Minutes of Exercise per Session:    Stress:      Feeling of Stress :    Social Connections:      Frequency of Communication with Friends and Family:      Frequency of Social Gatherings with Friends and Family:      Attends Bahai Services:      Active Member of Clubs or Organizations:      Attends Club or Organization Meetings:      Marital Status:    Intimate Partner Violence:      Fear of Current or Ex-Partner:      Emotionally Abused:      Physically Abused:      Sexually Abused:        PHYSICAL EXAM:    /78   Pulse 100   Temp 97.7  F (36.5  C) (Temporal)   Resp 18   Ht 1.473 m (4' 10\")   Wt 35.8 kg (79 lb)   LMP 09/26/2021   SpO2 100%   BMI 16.51 kg/m      Constitutional: Awake, alert, in no acute distress  Respiratory:  no acute respiratory distress.  Cardiovascular: Regular rate and rhythm.  GI: Abdomen soft, non-tender to palpation in all 4 quadrants. No guarding or rebound. Bowel sounds intact on all 4 quadrants.  Some minimal left lower pelvic tenderness  Back: No CVA tenderness.      ED COURSE:    9:12 PM Met the patient and performed my initial exam. We discussed results and the plan for discharge. Patient is agreeable.     LAB:  All pertinent labs reviewed and interpreted.  Results for orders placed or performed during the hospital encounter of 10/13/21   UA with Microscopic reflex to Culture    Specimen: Urine, Clean Catch   Result Value Ref Range    Color Urine Light Yellow Colorless, Straw, Light Yellow, Yellow    Appearance Urine Clear Clear    Glucose Urine Negative Negative mg/dL    Bilirubin Urine Negative Negative    Ketones Urine Negative Negative mg/dL    Specific Gravity Urine 1.013 1.001 - 1.030    Blood Urine 0.03 mg/dL (A) Negative    pH Urine 6.0 5.0 - 7.0    Protein Albumin Urine Negative Negative mg/dL    Urobilinogen Urine <2.0 <2.0 mg/dL    Nitrite Urine Negative Negative    Leukocyte Esterase " Urine 500 Carie/uL (A) Negative    Bacteria Urine Few (A) None Seen /HPF    RBC Urine 4 (H) <=2 /HPF    WBC Urine 7 (H) <=5 /HPF    Squamous Epithelials Urine 2 (H) <=1 /HPF   Wet prep    Specimen: Vagina; Swab   Result Value Ref Range    Trichomonas Absent Absent    Yeast Present (A) Absent    Clue Cells Present (A) Absent    WBCs/high power field 1+ (A) None       RADIOLOGY:  Reviewed all pertinent imaging. Please see official radiology report.  No orders to display        MEDICATIONS GIVEN IN THE EMERGENCY:  Medications - No data to display        NEW PRESCRIPTIONS STARTED AT TODAY'S ER VISIT:  New Prescriptions    CLINDAMYCIN (CLEOCIN 1 DOSE) 2 % VAGINAL CREAM    BV    CLOTRIMAZOLE (LOTRIMIN) 1 % VAGINAL CREAM    Place 1 Applicatorful vaginally At Bedtime for 7 days For yeast          FINAL DIAGNOSIS:    ICD-10-CM    1. Bacterial vaginosis  N76.0     B96.89    2. Candidiasis of vagina  B37.3             At the conclusion of the encounter I discussed the results of all of the tests and the disposition. The questions were answered. The patient or family acknowledged understanding and was agreeable with the care plan.     NAME: Mellissa Prabhakar  AGE: 21 year old female  YOB: 2000  MRN: [id number]  EVALUATION DATE & TIME: 10/13/2021  8:52 PM    PCP: No Ref-Primary, Physician    ED PROVIDER: RAMAN Hayes Ethan Anderson, am serving as a scribe to document services personally performed by Dr. Jeremy Colbert based on my observation and the provider's statements to me. IJeremy MD attest that Zoran May is acting in a scribe capacity, has observed my performance of the services and has documented them in accordance with my direction.    Jeremy Colbert M.D.  Emergency Medicine  [facility] EMERGENCY DEPARTMENT  [address] [facility] [address] [id number] [phone]  Dept: [phone]  10/13/2021       Jeremy Colbert MD  10/13/21 [id number]

## 2021-10-14 NOTE — DISCHARGE INSTRUCTIONS
You are being treated for bacterial vaginosis and yeast infection.  You may apply both creams nightly.

## 2021-11-16 ENCOUNTER — OFFICE VISIT (OUTPATIENT)
Dept: FAMILY MEDICINE | Facility: CLINIC | Age: 21
End: 2021-11-16
Payer: COMMERCIAL

## 2021-11-16 VITALS
HEART RATE: 95 BPM | OXYGEN SATURATION: 100 % | TEMPERATURE: 98.5 F | SYSTOLIC BLOOD PRESSURE: 107 MMHG | DIASTOLIC BLOOD PRESSURE: 72 MMHG

## 2021-11-16 DIAGNOSIS — N89.8 VAGINAL ITCHING: Primary | ICD-10-CM

## 2021-11-16 LAB
CLUE CELLS: ABNORMAL
TRICHOMONAS, WET PREP: ABNORMAL
WBC'S/HIGH POWER FIELD, WET PREP: ABNORMAL
YEAST, WET PREP: ABNORMAL

## 2021-11-16 PROCEDURE — 99213 OFFICE O/P EST LOW 20 MIN: CPT | Performed by: PHYSICIAN ASSISTANT

## 2021-11-16 PROCEDURE — 87210 SMEAR WET MOUNT SALINE/INK: CPT

## 2021-11-16 RX ORDER — MICONAZOLE NITRATE 20 MG/G
CREAM TOPICAL 2 TIMES DAILY
Qty: 28 G | Refills: 0 | Status: SHIPPED | OUTPATIENT
Start: 2021-11-16 | End: 2021-11-23

## 2021-11-16 RX ORDER — FLUCONAZOLE 150 MG/1
150 TABLET ORAL ONCE
Qty: 2 TABLET | Refills: 0 | Status: SHIPPED | OUTPATIENT
Start: 2021-11-16 | End: 2021-11-16

## 2021-11-16 NOTE — PROGRESS NOTES
Patient presents with:  Vaginal Itching: vaginal itch last month. Pt states last month had similar symptoms, but symptoms did not go away. Vaginal itch inside and outside.       Clinical Decision Making: Patient experiencing ongoing vaginal itching previously treated for BV and yeast.  Wet prep is negative today, but symptoms sound most consistent with yeast infection.  Patient treated with oral Diflucan and external miconazole.  Patient will follow up if symptoms fail to improve.  Patient requesting medication to stimulate patient's appetite.  Recommend follow-up with primary care for further discussion.      ICD-10-CM    1. Vaginal itching  N89.8 Wet prep - Clinic Collect     fluconazole (DIFLUCAN) 150 MG tablet     miconazole (MICATIN) 2 % external cream       Patient Instructions   1.  Take Diflucan today.  If no improvement over the course the next 3 days take the second pill.  2.  Apply topical miconazole cream twice per day for the next 7 days.  3.  Follow-up if you have not had any symptom improvement over the course the next 6 days.  4.  I recommend scheduling an appointment with a primary care provider to discuss your low appetite.      HPI:  Mellissa Prabhakar is a 21 year old female who presents today complaining of vaginal itching going on for over a month.  Patient was previously seen on 10/13/2021.  At that time she had both yeast and bacterial vaginosis.  She was treated with clotrimazole vaginal cream and clindamycin vaginal cream.    History obtained from the patient.    Problem List:  There are no relevant problems documented for this patient.      No past medical history on file.    Social History     Tobacco Use     Smoking status: Never Smoker     Smokeless tobacco: Never Used   Substance Use Topics     Alcohol use: Not on file       Review of Systems    Vitals:    11/16/21 1454   BP: 107/72   BP Location: Right arm   Patient Position: Chair   Cuff Size: Adult Regular   Pulse: 95   Temp: 98.5   F (36.9  C)   TempSrc: Oral   SpO2: 100%       Physical Exam  Vitals and nursing note reviewed.   Constitutional:       General: She is not in acute distress.     Appearance: She is not toxic-appearing or diaphoretic.   HENT:      Head: Normocephalic and atraumatic.      Right Ear: External ear normal.      Left Ear: External ear normal.   Eyes:      Conjunctiva/sclera: Conjunctivae normal.   Pulmonary:      Effort: Pulmonary effort is normal. No respiratory distress.   Genitourinary:     Comments: No significant rash present that is visible on the labia, but patient states that the skin feels raw when it was examined.  White discharge noted at the opening of the introitus  Neurological:      Mental Status: She is alert.   Psychiatric:         Mood and Affect: Mood normal.         Behavior: Behavior normal.         Thought Content: Thought content normal.         Judgment: Judgment normal.         Labs:  Results for orders placed or performed in visit on 11/16/21   Wet prep - Clinic Collect     Status: Abnormal    Specimen: Vagina; Swab   Result Value Ref Range    Trichomonas Absent Absent    Yeast Absent Absent    Clue Cells Absent Absent    WBCs/high power field 1+ (A) None       At the end of the encounter, I discussed results, diagnosis, medications. Discussed red flags for immediate return to clinic/ER, as well as indications for follow up if no improvement. Patient understood and agreed to plan. Patient was stable for discharge.

## 2021-11-16 NOTE — PATIENT INSTRUCTIONS
1.  Take Diflucan today.  If no improvement over the course the next 3 days take the second pill.  2.  Apply topical miconazole cream twice per day for the next 7 days.  3.  Follow-up if you have not had any symptom improvement over the course the next 6 days.  4.  I recommend scheduling an appointment with a primary care provider to discuss your low appetite.

## 2021-12-14 NOTE — PROGRESS NOTES
"  Assessment & Plan   Problem List Items Addressed This Visit     None      Visit Diagnoses     Nausea    -  Primary    Relevant Orders    Helicobacter pylori Antigen Stool (Completed)    Burning sensation of feet        Relevant Medications    gabapentin (NEURONTIN) 100 MG capsule    Eczema, unspecified type        Relevant Orders    Adult Dermatology Referral           Will await lab results and treat accordingly.  Recommend avoiding offending foods, sitting with head of the bed elevated, certainly trial of over-the-counter PPI.  Recommend patient schedule follow-up with primary in 4 weeks, sooner if needed.             No follow-ups on file.    Dunia Lamar NP  [facility] NANCY Malloy is a 21 year old who presents for the following health issues     HPI     Burning in feet x 1 month    Nausea x 1 month       Review of Systems   Unremarkable listed above and below      Objective    /62 (BP Location: Right arm, Patient Position: Sitting, Cuff Size: Adult Small)   Pulse 88   Temp 98.4  F (36.9  C) (Temporal)   Ht 1.417 m (4' 7.8\")   Wt 34.2 kg (75 lb 8 oz)   LMP 12/15/2021 (Exact Date)   SpO2 100%   Breastfeeding No   BMI 17.05 kg/m    Body mass index is 17.05 kg/m .  Physical Exam   GENERAL: healthy, alert and no distress  RESP: lungs clear to auscultation - no rales, rhonchi or wheezes  MS: no gross musculoskeletal defects noted, no edema pulses intact, normal sensation 5.0 7 monofilament  SKIN: no suspicious lesions or rashes  PSYCH: mentation appears normal, affect normal/bright                "

## 2021-12-15 ENCOUNTER — OFFICE VISIT (OUTPATIENT)
Dept: INTERNAL MEDICINE | Facility: CLINIC | Age: 21
End: 2021-12-15
Payer: COMMERCIAL

## 2021-12-15 VITALS
HEIGHT: 56 IN | WEIGHT: 75.5 LBS | SYSTOLIC BLOOD PRESSURE: 100 MMHG | OXYGEN SATURATION: 100 % | DIASTOLIC BLOOD PRESSURE: 62 MMHG | TEMPERATURE: 98.4 F | HEART RATE: 88 BPM | BODY MASS INDEX: 16.99 KG/M2

## 2021-12-15 DIAGNOSIS — R20.8 BURNING SENSATION OF FEET: ICD-10-CM

## 2021-12-15 DIAGNOSIS — L30.9 ECZEMA, UNSPECIFIED TYPE: ICD-10-CM

## 2021-12-15 DIAGNOSIS — R11.0 NAUSEA: Primary | ICD-10-CM

## 2021-12-15 PROCEDURE — 99213 OFFICE O/P EST LOW 20 MIN: CPT | Performed by: NURSE PRACTITIONER

## 2021-12-15 RX ORDER — GABAPENTIN 100 MG/1
100 CAPSULE ORAL AT BEDTIME
Qty: 30 CAPSULE | Refills: 0 | Status: SHIPPED | OUTPATIENT
Start: 2021-12-15 | End: 2022-02-02

## 2021-12-15 ASSESSMENT — MIFFLIN-ST. JEOR: SCORE: 952.3

## 2021-12-21 PROCEDURE — 87338 HPYLORI STOOL AG IA: CPT | Performed by: NURSE PRACTITIONER

## 2021-12-23 LAB — H PYLORI AG STL QL IA: NEGATIVE

## 2022-01-18 ENCOUNTER — OFFICE VISIT (OUTPATIENT)
Dept: FAMILY MEDICINE | Facility: CLINIC | Age: 22
End: 2022-01-18
Payer: COMMERCIAL

## 2022-01-18 VITALS
SYSTOLIC BLOOD PRESSURE: 127 MMHG | OXYGEN SATURATION: 100 % | HEART RATE: 90 BPM | DIASTOLIC BLOOD PRESSURE: 78 MMHG | TEMPERATURE: 98.4 F

## 2022-01-18 DIAGNOSIS — Z3A.01 LESS THAN 8 WEEKS GESTATION OF PREGNANCY: Primary | ICD-10-CM

## 2022-01-18 LAB — HCG UR QL: POSITIVE

## 2022-01-18 PROCEDURE — 99213 OFFICE O/P EST LOW 20 MIN: CPT | Performed by: PHYSICIAN ASSISTANT

## 2022-01-18 PROCEDURE — 81025 URINE PREGNANCY TEST: CPT | Performed by: PHYSICIAN ASSISTANT

## 2022-01-18 NOTE — PROGRESS NOTES
URGENT CARE VISIT:    SUBJECTIVE:    Mellissa Prabhakar is a 21 year old female who presents today for a repeat pregnancy test. She had one positive at home pregnancy test. LMP was December 15. She denies abdominal pain or vaginal bleeding.    PMH: History reviewed. No pertinent past medical history.  Allergies: Patient has no known allergies.   Medications:   Current Outpatient Medications   Medication Sig Dispense Refill     clindamycin (CLEOCIN 1 DOSE) 2 % vaginal cream BV (Patient not taking: Reported on 11/16/2021) 5 g 0     dicyclomine (BENTYL) 20 mg tablet [DICYCLOMINE (BENTYL) 20 MG TABLET] Take 1 tablet (20 mg total) by mouth 3 (three) times a day as needed. (Patient not taking: Reported on 11/16/2021) 30 tablet 0     gabapentin (NEURONTIN) 100 MG capsule Take 1 capsule (100 mg) by mouth At Bedtime (Patient not taking: Reported on 1/18/2022) 30 capsule 0     gabapentin (NEURONTIN) 100 MG capsule [GABAPENTIN (NEURONTIN) 100 MG CAPSULE] Take 100 mg by mouth 2 (two) times a day. (Patient not taking: Reported on 11/16/2021) 60 capsule 3     hydrocortisone 2.5 % cream [HYDROCORTISONE 2.5 % CREAM] Apply topically 2 (two) times a day. (Patient not taking: Reported on 12/15/2021) 30 g 0     hydrOXYzine pamoate (VISTARIL) 25 MG capsule [HYDROXYZINE PAMOATE (VISTARIL) 25 MG CAPSULE] Take 1-2 capsules (25-50 mg total) by mouth at bedtime as needed for itching. (Patient not taking: Reported on 11/16/2021) 90 capsule 1     ibuprofen (ADVIL,MOTRIN) 600 MG tablet [IBUPROFEN (ADVIL,MOTRIN) 600 MG TABLET] Take 1 tablet (600 mg total) by mouth every 6 (six) hours as needed for pain. (Patient not taking: Reported on 12/15/2021) 21 tablet 0     Social History:   Social History     Tobacco Use     Smoking status: Never Smoker     Smokeless tobacco: Never Used   Substance Use Topics     Alcohol use: Not on file       ROS:  Review of systems negative except as stated above.    OBJECTIVE:  /78 (BP Location: Right arm,  Patient Position: Sitting, Cuff Size: Child)   Pulse 90   Temp 98.4  F (36.9  C) (Tympanic)   SpO2 100%   GENERAL APPEARANCE: healthy, alert and no distress  RESP: lungs clear to auscultation - no rales, rhonchi or wheezes  CV: regular rates and rhythm, normal S1 S2, no murmur noted  ABDOMEN:  soft, nontender, no HSM or masses and bowel sounds normal  BACK: No CVA tenderness  SKIN: no suspicious lesions or rashes    Labs:    Results for orders placed or performed in visit on 01/18/22   HCG Qual, Urine ([id number])     Status: Abnormal   Result Value Ref Range    hCG Urine Qualitative Positive (A) Negative       ASSESSMENT:     ICD-10-CM    1. Less than 8 weeks gestation of pregnancy  Z3A.01 HCG Qual, Urine ([id number])     HCG Qual, Urine ([id number])     Ob/Gyn Referral       PLAN:  Patient Instructions   Patient was informed of positive pregnancy test. She is very early in pregnancy. She was referred to OBGYN.. Seek emergency care if you develop severe abdominal/flank pain, or vaginal bleeding.    Patient verbalized understanding and is agreeable to plan. The patient was discharged ambulatory and in stable condition.    Denae Brewer PA-C on 1/18/2022 at 5:30 PM

## 2022-01-18 NOTE — PATIENT INSTRUCTIONS
Patient was informed of positive pregnancy test. She is very early in pregnancy. She was referred to OBGYN.. Seek emergency care if you develop severe abdominal/flank pain, or vaginal bleeding.

## 2022-01-29 ENCOUNTER — APPOINTMENT (OUTPATIENT)
Dept: ULTRASOUND IMAGING | Facility: HOSPITAL | Age: 22
End: 2022-01-29
Attending: EMERGENCY MEDICINE
Payer: COMMERCIAL

## 2022-01-29 ENCOUNTER — HOSPITAL ENCOUNTER (EMERGENCY)
Facility: HOSPITAL | Age: 22
Discharge: HOME | End: 2022-01-29
Attending: EMERGENCY MEDICINE | Admitting: EMERGENCY MEDICINE
Payer: COMMERCIAL

## 2022-01-29 VITALS
DIASTOLIC BLOOD PRESSURE: 72 MMHG | BODY MASS INDEX: 16.94 KG/M2 | HEART RATE: 87 BPM | OXYGEN SATURATION: 100 % | SYSTOLIC BLOOD PRESSURE: 115 MMHG | RESPIRATION RATE: 16 BRPM | WEIGHT: 75 LBS | TEMPERATURE: 99.3 F

## 2022-01-29 DIAGNOSIS — R11.0 NAUSEA: ICD-10-CM

## 2022-01-29 DIAGNOSIS — O20.0 THREATENED MISCARRIAGE IN EARLY PREGNANCY: ICD-10-CM

## 2022-01-29 LAB
ABO/RH(D): NORMAL
ALBUMIN SERPL-MCNC: 4.5 G/DL (ref 3.5–5)
ALBUMIN UR-MCNC: NEGATIVE MG/DL
ALP SERPL-CCNC: 28 U/L (ref 45–120)
ALT SERPL W P-5'-P-CCNC: 11 U/L (ref 0–45)
ANION GAP SERPL CALCULATED.3IONS-SCNC: 7 MMOL/L (ref 5–18)
APPEARANCE UR: CLEAR
AST SERPL W P-5'-P-CCNC: 13 U/L (ref 0–40)
BASOPHILS # BLD AUTO: 0 10E3/UL (ref 0–0.2)
BASOPHILS NFR BLD AUTO: 0 %
BILIRUB SERPL-MCNC: 1.1 MG/DL (ref 0–1)
BILIRUB UR QL STRIP: NEGATIVE
BUN SERPL-MCNC: 6 MG/DL (ref 8–22)
CALCIUM SERPL-MCNC: 9.4 MG/DL (ref 8.5–10.5)
CHLORIDE BLD-SCNC: 106 MMOL/L (ref 98–107)
CO2 SERPL-SCNC: 23 MMOL/L (ref 22–31)
COLOR UR AUTO: COLORLESS
CREAT SERPL-MCNC: 0.59 MG/DL (ref 0.6–1.1)
EOSINOPHIL # BLD AUTO: 0.1 10E3/UL (ref 0–0.7)
EOSINOPHIL NFR BLD AUTO: 2 %
ERYTHROCYTE [DISTWIDTH] IN BLOOD BY AUTOMATED COUNT: 12.2 % (ref 10–15)
GFR SERPL CREATININE-BSD FRML MDRD: >90 ML/MIN/1.73M2
GLUCOSE BLD-MCNC: 78 MG/DL (ref 70–125)
GLUCOSE UR STRIP-MCNC: NEGATIVE MG/DL
HCG SERPL-ACNC: ABNORMAL MLU/ML (ref 0–4)
HCT VFR BLD AUTO: 37.3 % (ref 35–47)
HGB BLD-MCNC: 12.7 G/DL (ref 11.7–15.7)
HGB UR QL STRIP: NEGATIVE
IMM GRANULOCYTES # BLD: 0 10E3/UL
IMM GRANULOCYTES NFR BLD: 0 %
KETONES UR STRIP-MCNC: NEGATIVE MG/DL
LEUKOCYTE ESTERASE UR QL STRIP: NEGATIVE
LYMPHOCYTES # BLD AUTO: 2.2 10E3/UL (ref 0.8–5.3)
LYMPHOCYTES NFR BLD AUTO: 32 %
MCH RBC QN AUTO: 31.1 PG (ref 26.5–33)
MCHC RBC AUTO-ENTMCNC: 34 G/DL (ref 31.5–36.5)
MCV RBC AUTO: 91 FL (ref 78–100)
MONOCYTES # BLD AUTO: 0.4 10E3/UL (ref 0–1.3)
MONOCYTES NFR BLD AUTO: 6 %
NEUTROPHILS # BLD AUTO: 4.2 10E3/UL (ref 1.6–8.3)
NEUTROPHILS NFR BLD AUTO: 60 %
NITRATE UR QL: NEGATIVE
NRBC # BLD AUTO: 0 10E3/UL
NRBC BLD AUTO-RTO: 0 /100
PH UR STRIP: 6 [PH] (ref 5–7)
PLATELET # BLD AUTO: 157 10E3/UL (ref 150–450)
POTASSIUM BLD-SCNC: 3.5 MMOL/L (ref 3.5–5)
PROT SERPL-MCNC: 7.6 G/DL (ref 6–8)
RBC # BLD AUTO: 4.09 10E6/UL (ref 3.8–5.2)
RBC URINE: <1 /HPF
SODIUM SERPL-SCNC: 136 MMOL/L (ref 136–145)
SP GR UR STRIP: 1.01 (ref 1–1.03)
SPECIMEN EXPIRATION DATE: NORMAL
SQUAMOUS EPITHELIAL: <1 /HPF
UROBILINOGEN UR STRIP-MCNC: <2 MG/DL
WBC # BLD AUTO: 6.9 10E3/UL (ref 4–11)
WBC URINE: <1 /HPF

## 2022-01-29 PROCEDURE — 76801 OB US < 14 WKS SINGLE FETUS: CPT

## 2022-01-29 PROCEDURE — 96361 HYDRATE IV INFUSION ADD-ON: CPT

## 2022-01-29 PROCEDURE — 80053 COMPREHEN METABOLIC PANEL: CPT | Performed by: STUDENT IN AN ORGANIZED HEALTH CARE EDUCATION/TRAINING PROGRAM

## 2022-01-29 PROCEDURE — 82040 ASSAY OF SERUM ALBUMIN: CPT | Performed by: STUDENT IN AN ORGANIZED HEALTH CARE EDUCATION/TRAINING PROGRAM

## 2022-01-29 PROCEDURE — 86901 BLOOD TYPING SEROLOGIC RH(D): CPT | Performed by: EMERGENCY MEDICINE

## 2022-01-29 PROCEDURE — 36415 COLL VENOUS BLD VENIPUNCTURE: CPT | Performed by: STUDENT IN AN ORGANIZED HEALTH CARE EDUCATION/TRAINING PROGRAM

## 2022-01-29 PROCEDURE — 85025 COMPLETE CBC W/AUTO DIFF WBC: CPT | Performed by: STUDENT IN AN ORGANIZED HEALTH CARE EDUCATION/TRAINING PROGRAM

## 2022-01-29 PROCEDURE — 81001 URINALYSIS AUTO W/SCOPE: CPT | Performed by: STUDENT IN AN ORGANIZED HEALTH CARE EDUCATION/TRAINING PROGRAM

## 2022-01-29 PROCEDURE — 96374 THER/PROPH/DIAG INJ IV PUSH: CPT

## 2022-01-29 PROCEDURE — 84702 CHORIONIC GONADOTROPIN TEST: CPT | Performed by: STUDENT IN AN ORGANIZED HEALTH CARE EDUCATION/TRAINING PROGRAM

## 2022-01-29 PROCEDURE — 99285 EMERGENCY DEPT VISIT HI MDM: CPT | Mod: 25

## 2022-01-29 PROCEDURE — 250N000011 HC RX IP 250 OP 636: Performed by: EMERGENCY MEDICINE

## 2022-01-29 PROCEDURE — 258N000003 HC RX IP 258 OP 636: Performed by: STUDENT IN AN ORGANIZED HEALTH CARE EDUCATION/TRAINING PROGRAM

## 2022-01-29 RX ORDER — ONDANSETRON 2 MG/ML
4 INJECTION INTRAMUSCULAR; INTRAVENOUS ONCE
Status: DISCONTINUED | OUTPATIENT
Start: 2022-01-29 | End: 2022-01-29

## 2022-01-29 RX ORDER — METOCLOPRAMIDE 5 MG/1
5 TABLET ORAL 3 TIMES DAILY PRN
Qty: 15 TABLET | Refills: 0 | Status: SHIPPED | OUTPATIENT
Start: 2022-01-29 | End: 2022-02-02

## 2022-01-29 RX ORDER — METOCLOPRAMIDE HYDROCHLORIDE 5 MG/ML
5 INJECTION INTRAMUSCULAR; INTRAVENOUS ONCE
Status: COMPLETED | OUTPATIENT
Start: 2022-01-29 | End: 2022-01-29

## 2022-01-29 RX ADMIN — SODIUM CHLORIDE, POTASSIUM CHLORIDE, SODIUM LACTATE AND CALCIUM CHLORIDE 1000 ML: 600; 310; 30; 20 INJECTION, SOLUTION INTRAVENOUS at 01:46

## 2022-01-29 RX ADMIN — METOCLOPRAMIDE HYDROCHLORIDE 5 MG: 5 INJECTION INTRAMUSCULAR; INTRAVENOUS at 01:45

## 2022-01-29 ASSESSMENT — ENCOUNTER SYMPTOMS
VOMITING: 0
NAUSEA: 1
APPETITE CHANGE: 0
ABDOMINAL PAIN: 1

## 2022-01-29 NOTE — DISCHARGE INSTRUCTIONS
As discussed in the ER your ultrasound showed an early pregnancy at 6 weeks, 2 days and could be concerning for either early pregnancy or possible miscarriage as cardiac activity was not seen yet.  Recommend recheck of hormone and ultrasound with OB as scheduled this coming week however if pain increases or bleeding starts return to the ER for ultrasound.

## 2022-01-29 NOTE — ED PROVIDER NOTES
NAME: Mellissa Prabhakar  AGE: 21 year old female  YOB: 2000  MRN: [id number]  EVALUATION DATE & TIME: No admission date for patient encounter.    PCP: Dunia Lamar    ED PROVIDER: Peyman Peck M.D.      Chief Complaint   Patient presents with     Nausea     FINAL IMPRESSION:  1. Nausea    2. Threatened miscarriage in early pregnancy    3. Subchorionic hemorrhage of placenta in first trimester, single or unspecified fetus      MEDICAL DECISION MAKIN:37 AM I met with the patient, obtained history, performed an initial exam, and discussed options and plan for diagnostics and treatment here in the ED.   3:33 AM I rechecked and updated patient.  4:36 AM I rechecked and updated patient on results. We discussed the plan for discharge and the patient is agreeable. Reviewed supportive cares, symptomatic treatment, outpatient follow up, and reasons to return to the Emergency Department. Patient to be discharged by ED RN.     Patient was clinically assessed and consented to treatment. After assessment, medical decision making and workup were discussed with the patient. The patient was agreeable to plan for testing, workup, and treatment.  Pertinent Labs & Imaging studies reviewed. (See chart for details)         Mellissa Prabhakar is a 21 year old female who presents with nausea.   Differential diagnosis includes but not limited to morning sickness, hyperemesis gravidarum, miscarriage, ectopic pregnancy, molar pregnancy.  Patient with nausea and just found she is pregnant 2 weeks ago.  Patient does not have abdominal pain or tenderness presently.  She does report some cramps at home recently but no vaginal bleeding or discharge.  She has no dysuria or urinary symptoms.  Patient's vital signs are stable and labs drawn from triage already.  Patient's beta-hCG would be consistent with pregnancy and hemoglobin stable.  Metabolic panel was otherwise unremarkable.  Urinalysis showed no signs of  infection.  Patient sent for ultrasound and awaiting ABO/Rh.  Ultrasound showed 6-week, 2-day pregnancy though no fetal heart tones seen.  Patient could be concerning for threatened miscarriage and fetal demise versus early pregnancy and cardiac activity is not seen.  Additionally there was a small subchorionic hemorrhage seen as well though she is not having any bleeding presently she may develop some given a small hemorrhage.  Patient was positive and would not require RhoGam.  I discussed the ultrasound with patient and concern for fetal demise and miscarriage possibly which is concerning.  Patient was treated with IV fluids and Reglan with good relief of nausea.  After discussion with patient she has an appoint with obstetrics in 4 to 5 days and I would recommend serial follow-up for check of hemoglobin and possibly repeat ultrasound.  Patient feels otherwise well now and will be cautioned regarding the threatened miscarriage or fetal demise and recommended for follow-up with her OB and is to call in 2 days for recheck of symptoms otherwise if she does develop bleeding or pain she is to return to the ER for recheck ultrasound over the weekend.  Patient comfortable with plan will be discharged.    0 minutes of critical care time    MEDICATIONS GIVEN IN THE EMERGENCY:  Medications   lactated ringers BOLUS 1,000 mL (0 mLs Intravenous Stopped 1/29/22 0315)   metoclopramide (REGLAN) injection 5 mg (5 mg Intravenous Given 1/29/22 0145)       NEW PRESCRIPTIONS STARTED AT TODAY'S ER VISIT:  Discharge Medication List as of 1/29/2022  4:46 AM      START taking these medications    Details   metoclopramide (REGLAN) 5 MG tablet Take 1 tablet (5 mg) by mouth 3 times daily as needed (nausea), Disp-15 tablet, R-0, Local Print                =================================================================    HPI    Patient information was obtained from: Patient    Use of : N/A         Mellissa Prabhakar is a 21  year old female with no past medical history who presents to the ED via walk-in for the evaluation of nausea.    Patient reports nausea for the past two days, which worsened yesterday. Patient denies any appetite changes but notes that nausea is worse with eating. She reports she took a pregnancy test two weeks ago which was positive and notes that her last menstrual period was 12/15/22. Patient states that this would be her first pregnancy. Otherwise, she notes some abdominal cramping. Denies any vomiting, vaginal bleeding, and significant medical history. No other complaints at this time.    Per triage note, patient has an appointment with OB on 2/2/22.    Per chart review, patient was seen on 1/1822 at [facility] for less than 8 weeks gestation of pregnancy. She presents for a repeat pregnancy test after having a positive at home pregnancy test. Patient was informed of positive pregnancy test. She is very early in pregnancy. She was referred to OBGYN. Patient discharged with return precautions.    REVIEW OF SYSTEMS   Review of Systems   Constitutional: Negative for appetite change.   Gastrointestinal: Positive for abdominal pain (cramping) and nausea. Negative for vomiting.   Genitourinary: Negative for vaginal bleeding.   All other systems reviewed and are negative.     PAST MEDICAL HISTORY:  History reviewed. No pertinent past medical history.    PAST SURGICAL HISTORY:  History reviewed. No pertinent surgical history.    CURRENT MEDICATIONS:    No current facility-administered medications for this encounter.    Current Outpatient Medications:      metoclopramide (REGLAN) 5 MG tablet, Take 1 tablet (5 mg) by mouth 3 times daily as needed (nausea), Disp: 15 tablet, Rfl: 0     clindamycin (CLEOCIN 1 DOSE) 2 % vaginal cream, BV (Patient not taking: Reported on 11/16/2021), Disp: 5 g, Rfl: 0     dicyclomine (BENTYL) 20 mg tablet, [DICYCLOMINE (BENTYL) 20 MG TABLET] Take 1 tablet (20 mg total)  by mouth 3 (three) times a day as needed. (Patient not taking: Reported on 11/16/2021), Disp: 30 tablet, Rfl: 0     gabapentin (NEURONTIN) 100 MG capsule, Take 1 capsule (100 mg) by mouth At Bedtime (Patient not taking: Reported on 1/18/2022), Disp: 30 capsule, Rfl: 0     gabapentin (NEURONTIN) 100 MG capsule, [GABAPENTIN (NEURONTIN) 100 MG CAPSULE] Take 100 mg by mouth 2 (two) times a day. (Patient not taking: Reported on 11/16/2021), Disp: 60 capsule, Rfl: 3     hydrocortisone 2.5 % cream, [HYDROCORTISONE 2.5 % CREAM] Apply topically 2 (two) times a day. (Patient not taking: Reported on 12/15/2021), Disp: 30 g, Rfl: 0     hydrOXYzine pamoate (VISTARIL) 25 MG capsule, [HYDROXYZINE PAMOATE (VISTARIL) 25 MG CAPSULE] Take 1-2 capsules (25-50 mg total) by mouth at bedtime as needed for itching. (Patient not taking: Reported on 11/16/2021), Disp: 90 capsule, Rfl: 1     ibuprofen (ADVIL,MOTRIN) 600 MG tablet, [IBUPROFEN (ADVIL,MOTRIN) 600 MG TABLET] Take 1 tablet (600 mg total) by mouth every 6 (six) hours as needed for pain. (Patient not taking: Reported on 12/15/2021), Disp: 21 tablet, Rfl: 0    ALLERGIES:  No Known Allergies    FAMILY HISTORY:  History reviewed. No pertinent family history.    SOCIAL HISTORY:   Social History     Socioeconomic History     Marital status: Single     Spouse name: Not on file     Number of children: Not on file     Years of education: Not on file     Highest education level: Not on file   Occupational History     Not on file   Tobacco Use     Smoking status: Never Smoker     Smokeless tobacco: Never Used   Substance and Sexual Activity     Alcohol use: Not on file     Drug use: Not on file     Sexual activity: Not on file   Other Topics Concern     Not on file   Social History Narrative     Not on file     Social Determinants of Health     Financial Resource Strain: Not on file   Food Insecurity: Not on file   Transportation Needs: Not on file   Physical Activity: Not on file   Stress:  Not on file   Social Connections: Not on file   Intimate Partner Violence: Not on file   Housing Stability: Not on file     PHYSICAL EXAM:    Vitals: /72   Pulse 87   Temp 99.3  F (37.4  C) (Temporal)   Resp 16   Wt 34 kg (75 lb)   LMP 12/15/2021   SpO2 100%   BMI 16.94 kg/m     Physical Exam  Vitals and nursing note reviewed.   Constitutional:       General: She is not in acute distress.     Appearance: Normal appearance. She is normal weight. She is not ill-appearing or toxic-appearing.   HENT:      Head: Normocephalic.   Cardiovascular:      Rate and Rhythm: Normal rate and regular rhythm.      Heart sounds: Normal heart sounds.   Pulmonary:      Effort: Pulmonary effort is normal. No respiratory distress.      Breath sounds: Normal breath sounds.   Abdominal:      General: There is no distension.      Palpations: Abdomen is soft.      Tenderness: There is no abdominal tenderness. There is no right CVA tenderness, left CVA tenderness or guarding.   Musculoskeletal:      Cervical back: Normal range of motion.   Skin:     General: Skin is warm and dry.      Coloration: Skin is not pale.   Neurological:      General: No focal deficit present.      Mental Status: She is alert.   Psychiatric:         Behavior: Behavior normal.        LAB:  All pertinent labs reviewed and interpreted.  Labs Ordered and Resulted from Time of ED Arrival to Time of ED Departure   COMPREHENSIVE METABOLIC PANEL - Abnormal       Result Value    Sodium 136      Potassium 3.5      Chloride 106      Carbon Dioxide (CO2) 23      Anion Gap 7      Urea Nitrogen 6 (*)     Creatinine 0.59 (*)     Calcium 9.4      Glucose 78      Alkaline Phosphatase 28 (*)     AST 13      ALT 11      Protein Total 7.6      Albumin 4.5      Bilirubin Total 1.1 (*)     GFR Estimate >90     HCG QUANTITATIVE PREGNANCY - Abnormal    hCG Quantitative 40,521 (*)    ROUTINE UA WITH MICROSCOPIC REFLEX TO CULTURE - Normal    Color Urine Colorless      Appearance  Urine Clear      Glucose Urine Negative      Bilirubin Urine Negative      Ketones Urine Negative      Specific Gravity Urine 1.007      Blood Urine Negative      pH Urine 6.0      Protein Albumin Urine Negative      Urobilinogen Urine <2.0      Nitrite Urine Negative      Leukocyte Esterase Urine Negative      RBC Urine <1      WBC Urine <1      Squamous Epithelials Urine <1     CBC WITH PLATELETS AND DIFFERENTIAL    WBC Count 6.9      RBC Count 4.09      Hemoglobin 12.7      Hematocrit 37.3      MCV 91      MCH 31.1      MCHC 34.0      RDW 12.2      Platelet Count 157      % Neutrophils 60      % Lymphocytes 32      % Monocytes 6      % Eosinophils 2      % Basophils 0      % Immature Granulocytes 0      NRBCs per 100 WBC 0      Absolute Neutrophils 4.2      Absolute Lymphocytes 2.2      Absolute Monocytes 0.4      Absolute Eosinophils 0.1      Absolute Basophils 0.0      Absolute Immature Granulocytes 0.0      Absolute NRBCs 0.0     ABO AND RH    ABO/RH(D) A POS      SPECIMEN EXPIRATION DATE [id number]       RADIOLOGY:  US OB < 14 Weeks Single   Final Result   IMPRESSION:    1. Single intrauterine pregnancy at 6 weeks 2 days estimated gestational age based upon a crown-rump length measurement of 0.5 cm on the study. No cardiac activity is detected within the embryo. This could be due to the early gestational age. Embryonic    demise is also possible. A follow-up ultrasound in one week could be useful for reevaluation.   2. Small subchorionic hemorrhage.         PROCEDURES:   Procedures       I, Samira Bowens, am serving as a scribe to document services personally performed by Dr. Peyman Peck  based on my observation and the provider's statements to me. I, Peyman Peck MD attest that Samira Bowens is acting in a scribe capacity, has observed my performance of the services and has documented them in accordance with my direction.      Peyman Peck M.D.  Emergency Medicine  [facility]  Emergency Department     Peyman Peck MD  01/29/22 9874

## 2022-01-29 NOTE — ED TRIAGE NOTES
Patient reports nausea without vomiting x 2 days.  States she recently found out she was pregnant- unsure if this is r/t per patient.  LMP 12/15/2021.  Has appointment with OB on Wednesday.

## 2022-01-30 ENCOUNTER — HEALTH MAINTENANCE LETTER (OUTPATIENT)
Age: 22
End: 2022-01-30

## 2022-02-01 NOTE — PROGRESS NOTES
"  Assessment:     , 7w0d  Nausea and cramping in early preg  Burping     Plan:   1. Discussed working Estimated Date of Delivery: 22. Will get repeat US to confirm dating and progression. Last US on 22 revealed gestation of 6w2d and did not show cardiac activity due to possible early gestation or embryonic demise. Recommended follow up for 1 week later. Referral given.   2. Encouraged vitamin B6 and Unisom for nausea. Discussed many relief measures for nausea. Can try OTC anti gas for burping/gas relief. Patient requests refill for Reglan, Rx provided for 15 more tablets, but encouraged to try other medications first.  3. UA/UC ordered and pending.  4. Oriented to HE CNM care; group and practice info reviewed.   5. 1st trimester teaching: encouraged well-balanced diet, pre- vitamins, vitamin D3 and omega 3 supplements, daily and walking for exercise. Discussed warning signs and when to call. Phone numbers given to patient for appointments and on call CNM.  6. She will schedule her initial OB visit at 10-12 wks gestation, depending on results of US.     Total time spent with patient 40 minutes, >50% counseling, education and coordination of care.   Subjective:     Mellissa is a 21 year old y.o.  who presents with partner \"Nobin\" for pregnancy confirmation. pregnancy is planned/desired. Trying for 6 months. Has concerns today of nausea, without vomiting, and burping. States that the burping has been almost all day every day for the last week. Wondering if this is just from the nausea/gaggy feeling. Some mild cramping, but denies any bleeding or discharge. She was given Reglan in the ED on , but this has not been helping with symptoms. Has been taking 3 times a day as prescribed and only has a few tablets left.  Contraception: None -- is she planning pregnancy? Yes.      Current symptoms also include: fatigue, nausea and positive home pregnancy test.     Patient's last menstrual period was " "12/15/2021.. She is certain of this date. Menstrual cycles are regular, occuring every 26-27 days.  Last period was normal.    Medications: Reglan and no PNV  See medical history section  See family history section  General health/lifestyle:   Patient states she does not always wear a seatbelt.  There are firearms in the home   There are working fire detectors in the home.  No smoking or tobacco use.   In a typical week, exercise includes: No except walking  Diet: Well-balanced until she started to feel poorly with nausea and burping  In a typical week she drinks 0 alcoholic drinks.  No recreational drug use.   No physical, sexual or emotional abuse.   She is in a safe, monogamous relationship.   Sexual history/family planning: Pregnant now  For birth control she uses: None  Patient is sexually active. In the last 12 months patient has had only one partner. Types of sexual activity practiced: N/A  Last menstrual period: 12/15/2021  PMS includes cramping and headaches.  Patient does not want STI testing today.  Understands that this will be a part of her eye OB visit  Review of systems: NA      Review of Systems  Denies bleeding, pain, abnormal vaginal discharge or dysuria.    Objective:     BP 96/58 (BP Location: Right arm, Patient Position: Sitting, Cuff Size: Adult Regular)   Pulse 112   Ht 1.422 m (4' 8\")   Wt 34.5 kg (76 lb)   LMP 12/15/2021   SpO2 99%   BMI 17.04 kg/m     General: alert and no acute distress      Lab Review  Urine HCG: positive previously, UA today looks contaminated, will send UC just in case.    Patient was seen with student, DARRYL Gonzalez who was present for learning. I personally assessed, examined and made clinical decisions reflected in the documentation.  Patricia FERNANDES CNM    "

## 2022-02-02 ENCOUNTER — OFFICE VISIT (OUTPATIENT)
Dept: MIDWIFE SERVICES | Facility: CLINIC | Age: 22
End: 2022-02-02
Payer: COMMERCIAL

## 2022-02-02 VITALS
DIASTOLIC BLOOD PRESSURE: 58 MMHG | HEART RATE: 112 BPM | BODY MASS INDEX: 17.09 KG/M2 | HEIGHT: 56 IN | OXYGEN SATURATION: 99 % | SYSTOLIC BLOOD PRESSURE: 96 MMHG | WEIGHT: 76 LBS

## 2022-02-02 DIAGNOSIS — Z3A.01 LESS THAN 8 WEEKS GESTATION OF PREGNANCY: ICD-10-CM

## 2022-02-02 LAB
ALBUMIN UR-MCNC: >=300 MG/DL
APPEARANCE UR: CLEAR
BACTERIA #/AREA URNS HPF: ABNORMAL /HPF
BILIRUB UR QL STRIP: NEGATIVE
COLOR UR AUTO: YELLOW
GLUCOSE UR STRIP-MCNC: NEGATIVE MG/DL
HGB UR QL STRIP: NEGATIVE
KETONES UR STRIP-MCNC: NEGATIVE MG/DL
LEUKOCYTE ESTERASE UR QL STRIP: ABNORMAL
NITRATE UR QL: NEGATIVE
PH UR STRIP: 7 [PH] (ref 5–8)
RBC #/AREA URNS AUTO: ABNORMAL /HPF
SP GR UR STRIP: 1.02 (ref 1–1.03)
SQUAMOUS #/AREA URNS AUTO: ABNORMAL /LPF
UROBILINOGEN UR STRIP-ACNC: 0.2 E.U./DL
WBC #/AREA URNS AUTO: ABNORMAL /HPF

## 2022-02-02 PROCEDURE — 99203 OFFICE O/P NEW LOW 30 MIN: CPT | Performed by: MIDWIFE

## 2022-02-02 PROCEDURE — 87086 URINE CULTURE/COLONY COUNT: CPT | Performed by: MIDWIFE

## 2022-02-02 PROCEDURE — 81001 URINALYSIS AUTO W/SCOPE: CPT | Performed by: MIDWIFE

## 2022-02-02 RX ORDER — METOCLOPRAMIDE 5 MG/1
5 TABLET ORAL 3 TIMES DAILY PRN
Qty: 15 TABLET | Refills: 0 | Status: SHIPPED | OUTPATIENT
Start: 2022-02-02

## 2022-02-02 ASSESSMENT — MIFFLIN-ST. JEOR: SCORE: 957.73

## 2022-02-03 LAB — BACTERIA UR CULT: NO GROWTH

## 2022-06-20 ENCOUNTER — OFFICE VISIT (OUTPATIENT)
Dept: DERMATOLOGY | Facility: CLINIC | Age: 22
End: 2022-06-20
Attending: NURSE PRACTITIONER
Payer: COMMERCIAL

## 2022-06-20 VITALS — SYSTOLIC BLOOD PRESSURE: 114 MMHG | DIASTOLIC BLOOD PRESSURE: 78 MMHG | HEART RATE: 95 BPM | OXYGEN SATURATION: 99 %

## 2022-06-20 DIAGNOSIS — L30.1 DYSHIDROTIC ECZEMA: Primary | ICD-10-CM

## 2022-06-20 DIAGNOSIS — L29.9 LOCALIZED PRURITUS: ICD-10-CM

## 2022-06-20 DIAGNOSIS — L30.9 ECZEMA, UNSPECIFIED TYPE: ICD-10-CM

## 2022-06-20 PROCEDURE — 99203 OFFICE O/P NEW LOW 30 MIN: CPT | Performed by: PHYSICIAN ASSISTANT

## 2022-06-20 RX ORDER — FLUOCINONIDE 0.5 MG/G
CREAM TOPICAL 2 TIMES DAILY
Qty: 60 G | Refills: 2 | Status: SHIPPED | OUTPATIENT
Start: 2022-06-20

## 2022-06-20 NOTE — PATIENT INSTRUCTIONS
Dyshidrotic eczema   Apply a thin layer of lidex to affected area 2x a day for 2 weeks. Tapering with improvement. Do not use on face or body folds.  Discussed side effects of topical steroids including but not limited to atrophy (skin thinning), striae (stretch marks) telangiectasias, steroid acne, and others. Do not apply to normal skin. Do not apply to discolored skin that does not have rash present. Educated patient on post inflammatory hyperpigmentation.     [email or web address]

## 2022-06-20 NOTE — PROGRESS NOTES
HPI:  I was asked to see pt by Dunia Lamar NP. Mellissa Prabhakar is a 22 year old female patient here today for rash on hands and feet .  Patient states this has been present for on and off for 10 years.  Patient reports the following symptoms: itchy bumps, worse with sweat .  Patient reports the following previous treatments: topicals with clearance but then flares. Pt is pregnant. Due in september.  Patient reports the following modifying factors: none.  Associated symptoms: none.  Patient has no other skin complaints today.  Remainder of the HPI, Meds, PMH, Allergies, FH, and SH was reviewed in chart.      No past medical history on file.    No past surgical history on file.     Family History   Problem Relation Age of Onset     No Known Problems Mother      No Known Problems Father      No Known Problems Maternal Grandmother      No Known Problems Maternal Grandfather      No Known Problems Paternal Grandmother      No Known Problems Paternal Grandfather      No Known Problems Brother      No Known Problems Brother        Social History     Socioeconomic History     Marital status: Single     Spouse name: Not on file     Number of children: Not on file     Years of education: Not on file     Highest education level: Not on file   Occupational History     Not on file   Tobacco Use     Smoking status: Never Smoker     Smokeless tobacco: Never Used   Substance and Sexual Activity     Alcohol use: Never     Drug use: Never     Sexual activity: Not on file   Other Topics Concern     Not on file   Social History Narrative     Not on file     Social Determinants of Health     Financial Resource Strain: Not on file   Food Insecurity: Not on file   Transportation Needs: Not on file   Physical Activity: Not on file   Stress: Not on file   Social Connections: Not on file   Intimate Partner Violence: Not on file   Housing Stability: Not on file       Outpatient Encounter Medications as of 6/20/2022   Medication Sig  Dispense Refill     metoclopramide (REGLAN) 5 MG tablet Take 1 tablet (5 mg) by mouth 3 times daily as needed (nausea) 15 tablet 0     No facility-administered encounter medications on file as of 6/20/2022.       Review Of Systems:  Skin: rash  Eyes: negative  Ears/Nose/Throat: negative  Respiratory: No shortness of breath, dyspnea on exertion, cough, or hemoptysis  Cardiovascular: negative  Gastrointestinal: negative  Genitourinary: negative  Musculoskeletal: negative  Neurologic: negative  Psychiatric: negative  Hematologic/Lymphatic/Immunologic: negative  Endocrine: negative      Objective:     LMP 12/15/2021   Eyes: Conjunctivae/lids: Normal   ENT: Lips:  Normal  MSK: Normal  Cardiovascular: Peripheral edema none  Pulm: Breathing Normal  Neuro/Psych: Orientation: A/O x 3. Normal; Mood/Affect: Normal, NAD, WDWN  Pt accompanied by: boyfriend  Following areas examined: face, neck, hands, and feet. Pt wearing mask.   Ross skin type:iv   Findings:  Clear vesicles 3rd interweb space of left hand, yellow brown smooth macules on left palmar hand, red papules on dorsal feet    Assessment and Plan:     1) dyshidrotic eczema, localized pruritis  Disc chronic etiology and course  Disc topical steroids and NBUVB while pregnant  Disc dupixent and methotrexate for after preg and breast feeding  Pt elects topicals    Apply a thin layer of lidex to affected area 2x a day for 2 weeks. Tapering with improvement. Do not use on face or body folds.  Discussed side effects of topical steroids including but not limited to atrophy (skin thinning), striae (stretch marks) telangiectasias, steroid acne, and others. Do not apply to normal skin. Do not apply to discolored skin that does not have rash present. Educated patient on post inflammatory hyperpigmentation.     Proper skin care from [facility]:    -Eliminate harsh soaps as they strip the natural oils from the skin, often resulting in dry itchy skin ( i.e. Dial, Zest,  E[place]ian Spring)  -Use mild soaps such as Cetaphil or Dove Sensitive Skin in the shower. You do not need to use soap on arms, legs, and trunk every time you shower unless visibly soiled.   -Avoid hot or cold showers.  -After showering, lightly dry off and apply moisturizing within 2-3 minutes. This will help trap moisture in the skin.   -Aggressive use of a moisturizer at least 1-2 times a day to the entire body (including -Vanicream, Cetaphil, Aquaphor or Cerave) and moisturize hands after every washing.  -We recommend using moisturizers that come in a tub that needs to be scooped out, not a pump. This has more of an oil base. It will hold moisture in your skin much better than a water base moisturizer. The above recommended are non-pore clogging.         It was a pleasure speaking with Mellissa Prabhakar today.       Follow up in next available

## 2022-06-20 NOTE — LETTER
6/20/2022         RE: Mellissa Prabhakar  [address]  [place] [address]        Dear Colleague,    Thank you for referring your patient, Mellissa Prabhakar, to the [facility]. Please see a copy of my visit note below.    HPI:  I was asked to see pt by Dunia Lamar NP. Mellissa Prabhakar is a 22 year old female patient here today for rash on hands and feet .  Patient states this has been present for on and off for 10 years.  Patient reports the following symptoms: itchy bumps, worse with sweat .  Patient reports the following previous treatments: topicals with clearance but then flares. Pt is pregnant. Due in september.  Patient reports the following modifying factors: none.  Associated symptoms: none.  Patient has no other skin complaints today.  Remainder of the HPI, Meds, PMH, Allergies, FH, and SH was reviewed in chart.      No past medical history on file.    No past surgical history on file.     Family History   Problem Relation Age of Onset     No Known Problems Mother      No Known Problems Father      No Known Problems Maternal Grandmother      No Known Problems Maternal Grandfather      No Known Problems Paternal Grandmother      No Known Problems Paternal Grandfather      No Known Problems Brother      No Known Problems Brother        Social History     Socioeconomic History     Marital status: Single     Spouse name: Not on file     Number of children: Not on file     Years of education: Not on file     Highest education level: Not on file   Occupational History     Not on file   Tobacco Use     Smoking status: Never Smoker     Smokeless tobacco: Never Used   Substance and Sexual Activity     Alcohol use: Never     Drug use: Never     Sexual activity: Not on file   Other Topics Concern     Not on file   Social History Narrative     Not on file     Social Determinants of Health     Financial Resource Strain: Not on file   Food Insecurity: Not on file    Transportation Needs: Not on file   Physical Activity: Not on file   Stress: Not on file   Social Connections: Not on file   Intimate Partner Violence: Not on file   Housing Stability: Not on file       Outpatient Encounter Medications as of 6/20/2022   Medication Sig Dispense Refill     metoclopramide (REGLAN) 5 MG tablet Take 1 tablet (5 mg) by mouth 3 times daily as needed (nausea) 15 tablet 0     No facility-administered encounter medications on file as of 6/20/2022.       Review Of Systems:  Skin: rash  Eyes: negative  Ears/Nose/Throat: negative  Respiratory: No shortness of breath, dyspnea on exertion, cough, or hemoptysis  Cardiovascular: negative  Gastrointestinal: negative  Genitourinary: negative  Musculoskeletal: negative  Neurologic: negative  Psychiatric: negative  Hematologic/Lymphatic/Immunologic: negative  Endocrine: negative      Objective:     LMP 12/15/2021   Eyes: Conjunctivae/lids: Normal   ENT: Lips:  Normal  MSK: Normal  Cardiovascular: Peripheral edema none  Pulm: Breathing Normal  Neuro/Psych: Orientation: A/O x 3. Normal; Mood/Affect: Normal, NAD, WDWN  Pt accompanied by: boyfriend  Following areas examined: face, neck, hands, and feet. Pt wearing mask.   Ross skin type:iv   Findings:  Clear vesicles 3rd interweb space of left hand, yellow brown smooth macules on left palmar hand, red papules on dorsal feet    Assessment and Plan:     1) dyshidrotic eczema, localized pruritis  Disc chronic etiology and course  Disc topical steroids and NBUVB while pregnant  Disc dupixent and methotrexate for after preg and breast feeding  Pt elects topicals    Apply a thin layer of lidex to affected area 2x a day for 2 weeks. Tapering with improvement. Do not use on face or body folds.  Discussed side effects of topical steroids including but not limited to atrophy (skin thinning), striae (stretch marks) telangiectasias, steroid acne, and others. Do not apply to normal skin. Do not apply to  discolored skin that does not have rash present. Educated patient on post inflammatory hyperpigmentation.     Proper skin care from [facility]:    -Eliminate harsh soaps as they strip the natural oils from the skin, often resulting in dry itchy skin ( i.e. Dial, Zest, Kiswahili Spring)  -Use mild soaps such as Cetaphil or Dove Sensitive Skin in the shower. You do not need to use soap on arms, legs, and trunk every time you shower unless visibly soiled.   -Avoid hot or cold showers.  -After showering, lightly dry off and apply moisturizing within 2-3 minutes. This will help trap moisture in the skin.   -Aggressive use of a moisturizer at least 1-2 times a day to the entire body (including -Vanicream, Cetaphil, Aquaphor or Cerave) and moisturize hands after every washing.  -We recommend using moisturizers that come in a tub that needs to be scooped out, not a pump. This has more of an oil base. It will hold moisture in your skin much better than a water base moisturizer. The above recommended are non-pore clogging.         It was a pleasure speaking with Mellissa Prabhakar today.       Follow up in next available        Again, thank you for allowing me to participate in the care of your patient.        Sincerely,        Edith Olmedo PA-C

## 2022-08-26 ENCOUNTER — LAB REQUISITION (OUTPATIENT)
Dept: LAB | Facility: CLINIC | Age: 22
End: 2022-08-26
Payer: COMMERCIAL

## 2022-08-26 DIAGNOSIS — Z36.85 ENCOUNTER FOR ANTENATAL SCREENING FOR STREPTOCOCCUS B: ICD-10-CM

## 2022-08-26 PROCEDURE — 87653 STREP B DNA AMP PROBE: CPT | Performed by: OBSTETRICS & GYNECOLOGY

## 2022-08-27 LAB — GP B STREP DNA SPEC QL NAA+PROBE: NEGATIVE

## 2022-09-02 ENCOUNTER — HOSPITAL ENCOUNTER (INPATIENT)
Facility: CLINIC | Age: 22
LOS: 3 days | Discharge: HOME | End: 2022-09-05
Attending: OBSTETRICS & GYNECOLOGY | Admitting: OBSTETRICS & GYNECOLOGY
Payer: COMMERCIAL

## 2022-09-02 DIAGNOSIS — Z37.9 NORMAL LABOR: Primary | ICD-10-CM

## 2022-09-02 PROBLEM — Z36.89 ENCOUNTER FOR TRIAGE IN PREGNANT PATIENT: Status: ACTIVE | Noted: 2022-09-02

## 2022-09-02 LAB
ABO/RH(D): NORMAL
ANTIBODY SCREEN: NEGATIVE
HGB BLD-MCNC: 13.6 G/DL (ref 11.7–15.7)
PLATELET # BLD AUTO: 164 10E3/UL (ref 150–450)
SARS-COV-2 RNA RESP QL NAA+PROBE: NEGATIVE
SPECIMEN EXPIRATION DATE: NORMAL
T PALLIDUM AB SER QL: NONREACTIVE

## 2022-09-02 PROCEDURE — 86850 RBC ANTIBODY SCREEN: CPT | Performed by: OBSTETRICS & GYNECOLOGY

## 2022-09-02 PROCEDURE — 86901 BLOOD TYPING SEROLOGIC RH(D): CPT | Performed by: OBSTETRICS & GYNECOLOGY

## 2022-09-02 PROCEDURE — 86780 TREPONEMA PALLIDUM: CPT | Performed by: OBSTETRICS & GYNECOLOGY

## 2022-09-02 PROCEDURE — U0003 INFECTIOUS AGENT DETECTION BY NUCLEIC ACID (DNA OR RNA); SEVERE ACUTE RESPIRATORY SYNDROME CORONAVIRUS 2 (SARS-COV-2) (CORONAVIRUS DISEASE [COVID-19]), AMPLIFIED PROBE TECHNIQUE, MAKING USE OF HIGH THROUGHPUT TECHNOLOGIES AS DESCRIBED BY CMS-2020-01-R: HCPCS | Performed by: OBSTETRICS & GYNECOLOGY

## 2022-09-02 PROCEDURE — 120N000001 HC R&B MED SURG/OB

## 2022-09-02 PROCEDURE — 36415 COLL VENOUS BLD VENIPUNCTURE: CPT | Performed by: OBSTETRICS & GYNECOLOGY

## 2022-09-02 PROCEDURE — 85049 AUTOMATED PLATELET COUNT: CPT | Performed by: OBSTETRICS & GYNECOLOGY

## 2022-09-02 PROCEDURE — 250N000011 HC RX IP 250 OP 636: Performed by: OBSTETRICS & GYNECOLOGY

## 2022-09-02 PROCEDURE — 250N000013 HC RX MED GY IP 250 OP 250 PS 637: Performed by: OBSTETRICS & GYNECOLOGY

## 2022-09-02 PROCEDURE — C9803 HOPD COVID-19 SPEC COLLECT: HCPCS

## 2022-09-02 PROCEDURE — 85018 HEMOGLOBIN: CPT | Performed by: OBSTETRICS & GYNECOLOGY

## 2022-09-02 RX ORDER — OXYTOCIN/0.9 % SODIUM CHLORIDE 30/500 ML
340 PLASTIC BAG, INJECTION (ML) INTRAVENOUS CONTINUOUS PRN
Status: DISCONTINUED | OUTPATIENT
Start: 2022-09-02 | End: 2022-09-03 | Stop reason: HOSPADM

## 2022-09-02 RX ORDER — LIDOCAINE 40 MG/G
CREAM TOPICAL
Status: DISCONTINUED | OUTPATIENT
Start: 2022-09-02 | End: 2022-09-02 | Stop reason: HOSPADM

## 2022-09-02 RX ORDER — MISOPROSTOL 100 UG/1
25 TABLET ORAL
Status: DISCONTINUED | OUTPATIENT
Start: 2022-09-02 | End: 2022-09-03 | Stop reason: HOSPADM

## 2022-09-02 RX ORDER — METHYLERGONOVINE MALEATE 0.2 MG/ML
200 INJECTION INTRAVENOUS
Status: DISCONTINUED | OUTPATIENT
Start: 2022-09-02 | End: 2022-09-03 | Stop reason: HOSPADM

## 2022-09-02 RX ORDER — ACETAMINOPHEN 325 MG/1
650 TABLET ORAL EVERY 4 HOURS PRN
Status: DISCONTINUED | OUTPATIENT
Start: 2022-09-02 | End: 2022-09-03 | Stop reason: HOSPADM

## 2022-09-02 RX ORDER — METOCLOPRAMIDE 10 MG/1
10 TABLET ORAL EVERY 6 HOURS PRN
Status: DISCONTINUED | OUTPATIENT
Start: 2022-09-02 | End: 2022-09-03 | Stop reason: HOSPADM

## 2022-09-02 RX ORDER — FENTANYL CITRATE 50 UG/ML
50 INJECTION, SOLUTION INTRAMUSCULAR; INTRAVENOUS EVERY 30 MIN PRN
Status: DISCONTINUED | OUTPATIENT
Start: 2022-09-02 | End: 2022-09-03 | Stop reason: HOSPADM

## 2022-09-02 RX ORDER — NALOXONE HYDROCHLORIDE 0.4 MG/ML
0.2 INJECTION, SOLUTION INTRAMUSCULAR; INTRAVENOUS; SUBCUTANEOUS
Status: DISCONTINUED | OUTPATIENT
Start: 2022-09-02 | End: 2022-09-03 | Stop reason: HOSPADM

## 2022-09-02 RX ORDER — CARBOPROST TROMETHAMINE 250 UG/ML
250 INJECTION, SOLUTION INTRAMUSCULAR
Status: DISCONTINUED | OUTPATIENT
Start: 2022-09-02 | End: 2022-09-03 | Stop reason: HOSPADM

## 2022-09-02 RX ORDER — MORPHINE SULFATE 10 MG/ML
5 INJECTION, SOLUTION INTRAMUSCULAR; INTRAVENOUS
Status: COMPLETED | OUTPATIENT
Start: 2022-09-02 | End: 2022-09-02

## 2022-09-02 RX ORDER — METOCLOPRAMIDE HYDROCHLORIDE 5 MG/ML
10 INJECTION INTRAMUSCULAR; INTRAVENOUS EVERY 6 HOURS PRN
Status: DISCONTINUED | OUTPATIENT
Start: 2022-09-02 | End: 2022-09-03 | Stop reason: HOSPADM

## 2022-09-02 RX ORDER — NALOXONE HYDROCHLORIDE 0.4 MG/ML
0.4 INJECTION, SOLUTION INTRAMUSCULAR; INTRAVENOUS; SUBCUTANEOUS
Status: DISCONTINUED | OUTPATIENT
Start: 2022-09-02 | End: 2022-09-03 | Stop reason: HOSPADM

## 2022-09-02 RX ORDER — OXYTOCIN 10 [USP'U]/ML
10 INJECTION, SOLUTION INTRAMUSCULAR; INTRAVENOUS
Status: DISCONTINUED | OUTPATIENT
Start: 2022-09-02 | End: 2022-09-03 | Stop reason: HOSPADM

## 2022-09-02 RX ORDER — TERBUTALINE SULFATE 1 MG/ML
0.25 INJECTION, SOLUTION SUBCUTANEOUS
Status: DISCONTINUED | OUTPATIENT
Start: 2022-09-02 | End: 2022-09-03 | Stop reason: HOSPADM

## 2022-09-02 RX ORDER — PROCHLORPERAZINE 25 MG
25 SUPPOSITORY, RECTAL RECTAL EVERY 12 HOURS PRN
Status: DISCONTINUED | OUTPATIENT
Start: 2022-09-02 | End: 2022-09-03 | Stop reason: HOSPADM

## 2022-09-02 RX ORDER — OXYTOCIN 10 [USP'U]/ML
10 INJECTION, SOLUTION INTRAMUSCULAR; INTRAVENOUS
Status: DISCONTINUED | OUTPATIENT
Start: 2022-09-02 | End: 2022-09-05 | Stop reason: HOSPADM

## 2022-09-02 RX ORDER — CITRIC ACID/SODIUM CITRATE 334-500MG
30 SOLUTION, ORAL ORAL
Status: DISCONTINUED | OUTPATIENT
Start: 2022-09-02 | End: 2022-09-03 | Stop reason: HOSPADM

## 2022-09-02 RX ORDER — ONDANSETRON 2 MG/ML
4 INJECTION INTRAMUSCULAR; INTRAVENOUS EVERY 6 HOURS PRN
Status: DISCONTINUED | OUTPATIENT
Start: 2022-09-02 | End: 2022-09-03 | Stop reason: HOSPADM

## 2022-09-02 RX ORDER — IBUPROFEN 800 MG/1
800 TABLET, FILM COATED ORAL
Status: DISCONTINUED | OUTPATIENT
Start: 2022-09-02 | End: 2022-09-05 | Stop reason: HOSPADM

## 2022-09-02 RX ORDER — PROCHLORPERAZINE MALEATE 10 MG
10 TABLET ORAL EVERY 6 HOURS PRN
Status: DISCONTINUED | OUTPATIENT
Start: 2022-09-02 | End: 2022-09-03 | Stop reason: HOSPADM

## 2022-09-02 RX ORDER — KETOROLAC TROMETHAMINE 30 MG/ML
15 INJECTION, SOLUTION INTRAMUSCULAR; INTRAVENOUS
Status: DISCONTINUED | OUTPATIENT
Start: 2022-09-02 | End: 2022-09-05 | Stop reason: HOSPADM

## 2022-09-02 RX ORDER — CITRIC ACID/SODIUM CITRATE 334-500MG
30 SOLUTION, ORAL ORAL ONCE
Status: DISCONTINUED | OUTPATIENT
Start: 2022-09-02 | End: 2022-09-03 | Stop reason: HOSPADM

## 2022-09-02 RX ORDER — MISOPROSTOL 200 UG/1
800 TABLET ORAL
Status: DISCONTINUED | OUTPATIENT
Start: 2022-09-02 | End: 2022-09-03 | Stop reason: HOSPADM

## 2022-09-02 RX ORDER — OXYTOCIN/0.9 % SODIUM CHLORIDE 30/500 ML
100-340 PLASTIC BAG, INJECTION (ML) INTRAVENOUS CONTINUOUS PRN
Status: DISCONTINUED | OUTPATIENT
Start: 2022-09-02 | End: 2022-09-05 | Stop reason: HOSPADM

## 2022-09-02 RX ORDER — ONDANSETRON 4 MG/1
4 TABLET, ORALLY DISINTEGRATING ORAL EVERY 6 HOURS PRN
Status: DISCONTINUED | OUTPATIENT
Start: 2022-09-02 | End: 2022-09-03 | Stop reason: HOSPADM

## 2022-09-02 RX ORDER — HYDROXYZINE HYDROCHLORIDE 25 MG/1
50 TABLET, FILM COATED ORAL
Status: DISCONTINUED | OUTPATIENT
Start: 2022-09-02 | End: 2022-09-03 | Stop reason: HOSPADM

## 2022-09-02 RX ORDER — MISOPROSTOL 200 UG/1
400 TABLET ORAL
Status: DISCONTINUED | OUTPATIENT
Start: 2022-09-02 | End: 2022-09-03 | Stop reason: HOSPADM

## 2022-09-02 RX ADMIN — MISOPROSTOL 25 MCG: 100 TABLET ORAL at 23:00

## 2022-09-02 RX ADMIN — MISOPROSTOL 25 MCG: 100 TABLET ORAL at 18:58

## 2022-09-02 RX ADMIN — MORPHINE SULFATE 5 MG: 10 INJECTION INTRAVENOUS at 22:04

## 2022-09-02 RX ADMIN — MISOPROSTOL 25 MCG: 100 TABLET ORAL at 16:53

## 2022-09-02 RX ADMIN — HYDROXYZINE HYDROCHLORIDE 50 MG: 25 TABLET ORAL at 21:11

## 2022-09-02 RX ADMIN — MISOPROSTOL 25 MCG: 100 TABLET ORAL at 21:08

## 2022-09-02 ASSESSMENT — ACTIVITIES OF DAILY LIVING (ADL)
WALKING_OR_CLIMBING_STAIRS_DIFFICULTY: NO
ADLS_ACUITY_SCORE: 18
ADLS_ACUITY_SCORE: 18
CONCENTRATING,_REMEMBERING_OR_MAKING_DECISIONS_DIFFICULTY: NO
CHANGE_IN_FUNCTIONAL_STATUS_SINCE_ONSET_OF_CURRENT_ILLNESS/INJURY: NO
DIFFICULTY_COMMUNICATING: NO
FALL_HISTORY_WITHIN_LAST_SIX_MONTHS: NO
WEAR_GLASSES_OR_BLIND: NO
ADLS_ACUITY_SCORE: 18
HEARING_DIFFICULTY_OR_DEAF: NO
ADLS_ACUITY_SCORE: 18
DRESSING/BATHING_DIFFICULTY: NO
DOING_ERRANDS_INDEPENDENTLY_DIFFICULTY: NO
ADLS_ACUITY_SCORE: 18
TOILETING_ISSUES: NO
ADLS_ACUITY_SCORE: 18
DIFFICULTY_EATING/SWALLOWING: NO

## 2022-09-02 NOTE — H&P
[facility] Labor and Delivery History and Physical    Mellissa Prabhakar MRN# [id number]   Age: 22 year old YOB: 2000     Date of Admission:  2022    Primary care provider: Dunia Lamar           Chief Complaint:   Mellissa Prabhakar is a 22 year old female who is 37w2d pregnant and being admitted for SROM at 6 am now feeling some contractions.  Patient endorses good health and denies any complications to her pregnancy.          Pregnancy history:     OBSTETRIC HISTORY:    OB History    Para Term  AB Living   1 0 0 0 0 0   SAB IAB Ectopic Multiple Live Births   0 0 0 0 0      # Outcome Date GA Lbr Manny/2nd Weight Sex Delivery Anes PTL Lv   1 Current                EDC: Estimated Date of Delivery: 22    Prenatal Labs:   Lab Results   Component Value Date    HGB 12.7 2022       GBS Status:   No results found for: GBS    Active Problem List  Patient Active Problem List   Diagnosis     Encounter for triage in pregnant patient     Normal labor       Medication Prior to Admission  Medications Prior to Admission   Medication Sig Dispense Refill Last Dose     fluocinonide (LIDEX) 0.05 % external cream Apply topically 2 times daily To affected area on hands and feet for 2 weeks. Tapering with improvement and restarting with flares. 60 g 2      metoclopramide (REGLAN) 5 MG tablet Take 1 tablet (5 mg) by mouth 3 times daily as needed (nausea) 15 tablet 0    .        Maternal Past Medical History:   History reviewed. No pertinent past medical history.                  PSH denies         Physical Exam:   Vitals were reviewed  Alert, O x 3  CV regular  Resp non labored  Ab gravid    Limited bedside us demonstrates cephalic presentation  Cervix:   Membranes: SROM grossly ruptured clear fluid    Fetal Heart Rate Tracing: reactive and reassuring  Tocometer: external monitor                       Assessment:   Mellissa Prabhakar is a 37w2d pregnant female admitted  with SROM, early labor, GBS negative on 8/26/22  Pregnancy complicated by maternal low BMI and presence of venous lakes on usn, total weight gain 27 pounds with appropriate fetal growth        Plan:   Admit - see IP orders, expectant management for now, augmentation as needed, pain meds on request, COVID swab    Alondra Prather MD

## 2022-09-02 NOTE — PROGRESS NOTES
"[facility] Labor and Delivery Progress Note    Mellissa Prabhakar MRN# [id number]   Age: 22 year old YOB: 2000           Subjective:   Patient has been able to rest most of the day, cramps are about the same, does endorse some bloody show.           Objective:   Patient Vitals for the past 24 hrs:   Height Weight BMI (Calculated)   22 0925 1.448 m (4' 9\") 46.3 kg (102 lb) 22.07         Cervical Exam: 0-1 /   / -2      Position: Posterior    Membranes:   SROM    Fetal Heart Rate:    Monitor: intermittent auscultation, using Doppler    Variability: moderate (amplitude range 6 to 25 bpm)    Baseline Rate: normal range    Fetal Heart Rate Tracing: category 1          Assessment:   Mellissa Prabhakar is a 22 year old  who is 37w2d here with SROM, no evidence of active labor, GBS neg          Plan:   Patient is agreeable to cytotec for cervical ripening, will initiate at this time, limit SVEs, discussed risk of infection as labor prolongs.      Alondra Prather MD    "

## 2022-09-03 ENCOUNTER — ANESTHESIA EVENT (OUTPATIENT)
Dept: OBGYN | Facility: CLINIC | Age: 22
End: 2022-09-03
Payer: COMMERCIAL

## 2022-09-03 ENCOUNTER — ANESTHESIA (OUTPATIENT)
Dept: OBGYN | Facility: CLINIC | Age: 22
End: 2022-09-03
Payer: COMMERCIAL

## 2022-09-03 LAB
BASOPHILS # BLD AUTO: 0 10E3/UL (ref 0–0.2)
BASOPHILS NFR BLD AUTO: 0 %
CREAT SERPL-MCNC: 0.56 MG/DL (ref 0.6–1.1)
EOSINOPHIL # BLD AUTO: 0 10E3/UL (ref 0–0.7)
EOSINOPHIL NFR BLD AUTO: 0 %
ERYTHROCYTE [DISTWIDTH] IN BLOOD BY AUTOMATED COUNT: 11.9 % (ref 10–15)
GFR SERPL CREATININE-BSD FRML MDRD: >90 ML/MIN/1.73M2
HCT VFR BLD AUTO: 37.2 % (ref 35–47)
HGB BLD-MCNC: 12.8 G/DL (ref 11.7–15.7)
IMM GRANULOCYTES # BLD: 0.1 10E3/UL
IMM GRANULOCYTES NFR BLD: 1 %
LYMPHOCYTES # BLD AUTO: 1.1 10E3/UL (ref 0.8–5.3)
LYMPHOCYTES NFR BLD AUTO: 6 %
MCH RBC QN AUTO: 30.1 PG (ref 26.5–33)
MCHC RBC AUTO-ENTMCNC: 34.4 G/DL (ref 31.5–36.5)
MCV RBC AUTO: 88 FL (ref 78–100)
MONOCYTES # BLD AUTO: 1.1 10E3/UL (ref 0–1.3)
MONOCYTES NFR BLD AUTO: 6 %
NEUTROPHILS # BLD AUTO: 15.6 10E3/UL (ref 1.6–8.3)
NEUTROPHILS NFR BLD AUTO: 87 %
NRBC # BLD AUTO: 0 10E3/UL
NRBC BLD AUTO-RTO: 0 /100
PLATELET # BLD AUTO: 129 10E3/UL (ref 150–450)
RBC # BLD AUTO: 4.25 10E6/UL (ref 3.8–5.2)
WBC # BLD AUTO: 17.9 10E3/UL (ref 4–11)

## 2022-09-03 PROCEDURE — 999N000016 HC STATISTIC ATTENDANCE AT DELIVERY

## 2022-09-03 PROCEDURE — 00HU33Z INSERTION OF INFUSION DEVICE INTO SPINAL CANAL, PERCUTANEOUS APPROACH: ICD-10-PCS | Performed by: ANESTHESIOLOGY

## 2022-09-03 PROCEDURE — 85025 COMPLETE CBC W/AUTO DIFF WBC: CPT

## 2022-09-03 PROCEDURE — 250N000013 HC RX MED GY IP 250 OP 250 PS 637

## 2022-09-03 PROCEDURE — 36415 COLL VENOUS BLD VENIPUNCTURE: CPT

## 2022-09-03 PROCEDURE — 722N000001 HC LABOR CARE VAGINAL DELIVERY SINGLE

## 2022-09-03 PROCEDURE — 0KQM0ZZ REPAIR PERINEUM MUSCLE, OPEN APPROACH: ICD-10-PCS | Performed by: OBSTETRICS & GYNECOLOGY

## 2022-09-03 PROCEDURE — 370N000003 HC ANESTHESIA WARD SERVICE

## 2022-09-03 PROCEDURE — 258N000003 HC RX IP 258 OP 636

## 2022-09-03 PROCEDURE — 250N000011 HC RX IP 250 OP 636: Performed by: ANESTHESIOLOGY

## 2022-09-03 PROCEDURE — 250N000009 HC RX 250: Performed by: ANESTHESIOLOGY

## 2022-09-03 PROCEDURE — 250N000009 HC RX 250: Performed by: OBSTETRICS & GYNECOLOGY

## 2022-09-03 PROCEDURE — 82565 ASSAY OF CREATININE: CPT

## 2022-09-03 PROCEDURE — 3E0E77Z INTRODUCTION OF ELECTROLYTIC AND WATER BALANCE SUBSTANCE INTO PRODUCTS OF CONCEPTION, VIA NATURAL OR ARTIFICIAL OPENING: ICD-10-PCS | Performed by: OBSTETRICS & GYNECOLOGY

## 2022-09-03 PROCEDURE — 999N000157 HC STATISTIC RCP TIME EA 10 MIN

## 2022-09-03 PROCEDURE — 120N000001 HC R&B MED SURG/OB

## 2022-09-03 PROCEDURE — 3E0R3BZ INTRODUCTION OF ANESTHETIC AGENT INTO SPINAL CANAL, PERCUTANEOUS APPROACH: ICD-10-PCS | Performed by: ANESTHESIOLOGY

## 2022-09-03 PROCEDURE — 250N000011 HC RX IP 250 OP 636

## 2022-09-03 PROCEDURE — 250N000009 HC RX 250

## 2022-09-03 PROCEDURE — 258N000003 HC RX IP 258 OP 636: Performed by: OBSTETRICS & GYNECOLOGY

## 2022-09-03 RX ORDER — IBUPROFEN 800 MG/1
800 TABLET, FILM COATED ORAL EVERY 6 HOURS PRN
Status: DISCONTINUED | OUTPATIENT
Start: 2022-09-03 | End: 2022-09-03

## 2022-09-03 RX ORDER — AMPICILLIN 2 G/1
2 INJECTION, POWDER, FOR SOLUTION INTRAVENOUS EVERY 6 HOURS
Status: DISCONTINUED | OUTPATIENT
Start: 2022-09-03 | End: 2022-09-03 | Stop reason: HOSPADM

## 2022-09-03 RX ORDER — MODIFIED LANOLIN
OINTMENT (GRAM) TOPICAL
Status: DISCONTINUED | OUTPATIENT
Start: 2022-09-03 | End: 2022-09-03

## 2022-09-03 RX ORDER — BUPIVACAINE HYDROCHLORIDE 2.5 MG/ML
INJECTION, SOLUTION EPIDURAL; INFILTRATION; INTRACAUDAL
Status: COMPLETED | OUTPATIENT
Start: 2022-09-03 | End: 2022-09-03

## 2022-09-03 RX ORDER — OXYTOCIN/0.9 % SODIUM CHLORIDE 30/500 ML
340 PLASTIC BAG, INJECTION (ML) INTRAVENOUS CONTINUOUS PRN
Status: DISCONTINUED | OUTPATIENT
Start: 2022-09-03 | End: 2022-09-03

## 2022-09-03 RX ORDER — IBUPROFEN 800 MG/1
800 TABLET, FILM COATED ORAL EVERY 6 HOURS PRN
Status: DISCONTINUED | OUTPATIENT
Start: 2022-09-03 | End: 2022-09-05 | Stop reason: HOSPADM

## 2022-09-03 RX ORDER — DOCUSATE SODIUM 100 MG/1
100 CAPSULE, LIQUID FILLED ORAL DAILY
Status: DISCONTINUED | OUTPATIENT
Start: 2022-09-04 | End: 2022-09-03

## 2022-09-03 RX ORDER — ACETAMINOPHEN 325 MG/1
650 TABLET ORAL EVERY 4 HOURS PRN
Status: DISCONTINUED | OUTPATIENT
Start: 2022-09-03 | End: 2022-09-03

## 2022-09-03 RX ORDER — NALBUPHINE HYDROCHLORIDE 10 MG/ML
2.5-5 INJECTION, SOLUTION INTRAMUSCULAR; INTRAVENOUS; SUBCUTANEOUS EVERY 6 HOURS PRN
Status: DISCONTINUED | OUTPATIENT
Start: 2022-09-03 | End: 2022-09-05 | Stop reason: HOSPADM

## 2022-09-03 RX ORDER — MISOPROSTOL 200 UG/1
400 TABLET ORAL
Status: DISCONTINUED | OUTPATIENT
Start: 2022-09-03 | End: 2022-09-05 | Stop reason: HOSPADM

## 2022-09-03 RX ORDER — CARBOPROST TROMETHAMINE 250 UG/ML
250 INJECTION, SOLUTION INTRAMUSCULAR
Status: DISCONTINUED | OUTPATIENT
Start: 2022-09-03 | End: 2022-09-03

## 2022-09-03 RX ORDER — BISACODYL 10 MG
10 SUPPOSITORY, RECTAL RECTAL DAILY PRN
Status: DISCONTINUED | OUTPATIENT
Start: 2022-09-03 | End: 2022-09-03

## 2022-09-03 RX ORDER — MISOPROSTOL 200 UG/1
800 TABLET ORAL
Status: DISCONTINUED | OUTPATIENT
Start: 2022-09-03 | End: 2022-09-03

## 2022-09-03 RX ORDER — ACETAMINOPHEN 325 MG/1
975 TABLET ORAL EVERY 6 HOURS
Status: DISCONTINUED | OUTPATIENT
Start: 2022-09-03 | End: 2022-09-03 | Stop reason: HOSPADM

## 2022-09-03 RX ORDER — HYDROCORTISONE 25 MG/G
CREAM TOPICAL 3 TIMES DAILY PRN
Status: DISCONTINUED | OUTPATIENT
Start: 2022-09-03 | End: 2022-09-03

## 2022-09-03 RX ORDER — EPHEDRINE SULFATE 50 MG/ML
5 INJECTION, SOLUTION INTRAMUSCULAR; INTRAVENOUS; SUBCUTANEOUS
Status: DISCONTINUED | OUTPATIENT
Start: 2022-09-03 | End: 2022-09-03 | Stop reason: HOSPADM

## 2022-09-03 RX ORDER — OXYTOCIN 10 [USP'U]/ML
10 INJECTION, SOLUTION INTRAMUSCULAR; INTRAVENOUS
Status: DISCONTINUED | OUTPATIENT
Start: 2022-09-03 | End: 2022-09-05 | Stop reason: HOSPADM

## 2022-09-03 RX ORDER — SODIUM CHLORIDE, SODIUM LACTATE, POTASSIUM CHLORIDE, CALCIUM CHLORIDE 600; 310; 30; 20 MG/100ML; MG/100ML; MG/100ML; MG/100ML
INJECTION, SOLUTION INTRAVENOUS CONTINUOUS PRN
Status: DISCONTINUED | OUTPATIENT
Start: 2022-09-03 | End: 2022-09-03 | Stop reason: HOSPADM

## 2022-09-03 RX ORDER — OXYTOCIN 10 [USP'U]/ML
10 INJECTION, SOLUTION INTRAMUSCULAR; INTRAVENOUS
Status: DISCONTINUED | OUTPATIENT
Start: 2022-09-03 | End: 2022-09-03

## 2022-09-03 RX ORDER — MISOPROSTOL 200 UG/1
400 TABLET ORAL
Status: DISCONTINUED | OUTPATIENT
Start: 2022-09-03 | End: 2022-09-03

## 2022-09-03 RX ORDER — METHYLERGONOVINE MALEATE 0.2 MG/ML
200 INJECTION INTRAVENOUS
Status: DISCONTINUED | OUTPATIENT
Start: 2022-09-03 | End: 2022-09-03

## 2022-09-03 RX ORDER — OXYTOCIN/0.9 % SODIUM CHLORIDE 30/500 ML
1-24 PLASTIC BAG, INJECTION (ML) INTRAVENOUS CONTINUOUS
Status: DISCONTINUED | OUTPATIENT
Start: 2022-09-03 | End: 2022-09-03 | Stop reason: HOSPADM

## 2022-09-03 RX ORDER — OXYTOCIN/0.9 % SODIUM CHLORIDE 30/500 ML
340 PLASTIC BAG, INJECTION (ML) INTRAVENOUS CONTINUOUS PRN
Status: DISCONTINUED | OUTPATIENT
Start: 2022-09-03 | End: 2022-09-05 | Stop reason: HOSPADM

## 2022-09-03 RX ORDER — MISOPROSTOL 200 UG/1
800 TABLET ORAL
Status: DISCONTINUED | OUTPATIENT
Start: 2022-09-03 | End: 2022-09-05 | Stop reason: HOSPADM

## 2022-09-03 RX ORDER — AMPICILLIN 2 G/1
2 INJECTION, POWDER, FOR SOLUTION INTRAVENOUS EVERY 6 HOURS
Status: COMPLETED | OUTPATIENT
Start: 2022-09-04 | End: 2022-09-04

## 2022-09-03 RX ORDER — SODIUM CHLORIDE 9 MG/ML
INJECTION, SOLUTION INTRAVENOUS CONTINUOUS
Status: DISCONTINUED | OUTPATIENT
Start: 2022-09-03 | End: 2022-09-03 | Stop reason: HOSPADM

## 2022-09-03 RX ORDER — MODIFIED LANOLIN
OINTMENT (GRAM) TOPICAL
Status: DISCONTINUED | OUTPATIENT
Start: 2022-09-03 | End: 2022-09-05 | Stop reason: HOSPADM

## 2022-09-03 RX ORDER — METHYLERGONOVINE MALEATE 0.2 MG/ML
200 INJECTION INTRAVENOUS
Status: DISCONTINUED | OUTPATIENT
Start: 2022-09-03 | End: 2022-09-05 | Stop reason: HOSPADM

## 2022-09-03 RX ORDER — LIDOCAINE HCL/EPINEPHRINE/PF 2%-1:200K
VIAL (ML) INJECTION PRN
Status: DISCONTINUED | OUTPATIENT
Start: 2022-09-03 | End: 2022-09-03

## 2022-09-03 RX ORDER — LIDOCAINE 40 MG/G
CREAM TOPICAL
Status: DISCONTINUED | OUTPATIENT
Start: 2022-09-03 | End: 2022-09-03 | Stop reason: HOSPADM

## 2022-09-03 RX ADMIN — Medication 8 ML/HR: at 02:36

## 2022-09-03 RX ADMIN — SODIUM CHLORIDE, POTASSIUM CHLORIDE, SODIUM LACTATE AND CALCIUM CHLORIDE 1000 ML: 600; 310; 30; 20 INJECTION, SOLUTION INTRAVENOUS at 00:57

## 2022-09-03 RX ADMIN — BUPIVACAINE HYDROCHLORIDE 10 ML: 2.5 INJECTION, SOLUTION EPIDURAL; INFILTRATION; INTRACAUDAL at 02:38

## 2022-09-03 RX ADMIN — AMPICILLIN SODIUM 2 G: 2 INJECTION, POWDER, FOR SOLUTION INTRAMUSCULAR; INTRAVENOUS at 15:32

## 2022-09-03 RX ADMIN — SODIUM CHLORIDE, POTASSIUM CHLORIDE, SODIUM LACTATE AND CALCIUM CHLORIDE: 600; 310; 30; 20 INJECTION, SOLUTION INTRAVENOUS at 08:51

## 2022-09-03 RX ADMIN — LIDOCAINE HYDROCHLORIDE,EPINEPHRINE BITARTRATE 6 ML: 20; .005 INJECTION, SOLUTION EPIDURAL; INFILTRATION; INTRACAUDAL; PERINEURAL at 13:06

## 2022-09-03 RX ADMIN — LIDOCAINE HYDROCHLORIDE 10 ML: 10 INJECTION, SOLUTION EPIDURAL; INFILTRATION; INTRACAUDAL; PERINEURAL at 20:55

## 2022-09-03 RX ADMIN — GENTAMICIN SULFATE 90 MG: 40 INJECTION, SOLUTION INTRAMUSCULAR; INTRAVENOUS at 16:16

## 2022-09-03 RX ADMIN — AMPICILLIN SODIUM 2 G: 2 INJECTION, POWDER, FOR SOLUTION INTRAMUSCULAR; INTRAVENOUS at 21:41

## 2022-09-03 RX ADMIN — Medication 2 MILLI-UNITS/MIN: at 03:19

## 2022-09-03 RX ADMIN — SODIUM CHLORIDE, POTASSIUM CHLORIDE, SODIUM LACTATE AND CALCIUM CHLORIDE: 600; 310; 30; 20 INJECTION, SOLUTION INTRAVENOUS at 19:26

## 2022-09-03 RX ADMIN — SODIUM CHLORIDE 250 ML: 9 INJECTION, SOLUTION INTRAVENOUS at 08:24

## 2022-09-03 RX ADMIN — Medication 8 ML/HR: at 14:12

## 2022-09-03 RX ADMIN — SODIUM CHLORIDE: 9 INJECTION, SOLUTION INTRAVENOUS at 09:01

## 2022-09-03 RX ADMIN — ACETAMINOPHEN 975 MG: 325 TABLET ORAL at 21:41

## 2022-09-03 RX ADMIN — ACETAMINOPHEN 975 MG: 325 TABLET ORAL at 15:34

## 2022-09-03 ASSESSMENT — ACTIVITIES OF DAILY LIVING (ADL)
ADLS_ACUITY_SCORE: 18

## 2022-09-03 NOTE — PLAN OF CARE
Problem: Plan of Care - These are the overarching goals to be used throughout the patient stay.    Goal: Plan of Care Review/Shift Note    Outcome: Ongoing, Progressing  Flowsheets (Taken 9/3/2022 1929)  Plan of Care Reviewed With:   patient   spouse  Overall Patient Progress: improving     Problem: Change in Fetal Wellbeing (Labor)  Goal: Stable Fetal Wellbeing  Outcome: Ongoing, Progressing  Pt on constant EFM     Problem: Labor Pain (Labor)  Goal: Acceptable Pain Control  Outcome: Ongoing, Progressing  Pt states no c/o since epidural placement

## 2022-09-03 NOTE — PROGRESS NOTES
Labor Progress Note    Assessment/Plan  22 year old  at 37w3d gestational age admitted for SROM on .     - Continue to monitor FHR  - Anticipate   - Decreased Pitocin back down to 4 milliunits/min  - Epidural in place    Subjective  Patient laying in bed in moderate distress.  Patient states that a majority of her pain is located in the left side of her pelvis over her hip.  She denies any nausea, vomiting, shortness of breath, or headache.      Objective  Vital signs in last 24 hours  Temp:  [97.9  F (36.6  C)-98.7  F (37.1  C)] 98.1  F (36.7  C)  Pulse:  [] 90  Resp:  [16-18] 16  BP: ()/(52-91) 101/63  SpO2:  [92 %-99 %] 92 %      Physical Exam  General:   Abd: Gravid, soft, nontender  Cervix: 7 cm, 80% effaced, -1 station  FHR: Baseline 150/moderate variability/+ accels/- decels; Category 1 tracing  [place]: Contractions Q 2-3 min  Extremities: Minimal peripheral edema    Pertinent Labs   Lab Results   Component Value Date    ABORH A POS 2022    HGB 13.6 2022        Patient discussed with attending physician, Dr. Crystal Fitzpatrick , who agrees with the plan.     Andrew Suggs DO PGY1 9/3/2022  [facility]

## 2022-09-03 NOTE — PROGRESS NOTES
Labor Progress Note    Assessment/Plan  22 year old  at 37w3d gestational age admitted for SROM on .     - Continue to monitor FHR  - Anticipate   - Initiated amp and gent for suspicion of chorioamnionitis  - Continue Pitocin at 2 milliunits/min  - Epidural in place    Subjective  Patient laying in bed complaining of drowsiness.  States she is having a hard time staying awake.  Per RN, patient needed to be aroused by nurse and  in order to obtain an oral temperature.  Patient denies any chest pain, shortness of breath, or headache.      Objective  Vital signs in last 24 hours  Temp:  [97.9  F (36.6  C)-101.3  F (38.5  C)] 99.6  F (37.6  C)  Pulse:  [] 90  Resp:  [16-18] 16  BP: ()/(52-91) 113/74  SpO2:  [92 %-100 %] 100 %      Physical Exam  General:   Abd: Gravid, soft, nontender  Cervix: 9 cm, 100% effaced, 0 station  FHR: Baseline 170/moderate variability/+ accels/- decels; Category 2 tracing  [place]: Contractions Q 3-5 min  Extremities: Minimal peripheral edema    Pertinent Labs   Lab Results   Component Value Date    ABORH A POS 2022    HGB 13.6 2022          Patient discussed with attending physician, Dr. Crystal Fitzpatrick , who agrees with the plan.     Andrew Suggs,  PGY1 9/3/2022  [facility] Residency [facility]

## 2022-09-03 NOTE — ANESTHESIA PROCEDURE NOTES
Epidural catheter Procedure Note    Pre-Procedure   Staff -        Anesthesiologist:  Anshu Clark MD       Performed By: anesthesiologist       Location: OB       Procedure Start/Stop Times: 9/3/2022 2:20 AM and 9/3/2022 2:38 AM       Pre-Anesthestic Checklist: patient identified, IV checked, risks and benefits discussed, informed consent, monitors and equipment checked, pre-op evaluation, at physician/surgeon's request and post-op pain management  Timeout:       Correct Patient: Yes        Correct Procedure: Yes        Correct Site: Yes        Correct Position: Yes   Procedure Documentation  Procedure: epidural catheter       Diagnosis: labor pain       Patient Position: sitting       Patient Prep/Sterile Barriers: sterile gloves, mask, patient draped       Skin prep: Chloraprep       Local skin infiltrated with 2 mL of 1% lidocaine.        Insertion Site: L3-4. (midline approach).       Technique: LORT saline and LORT air        Needle type: Caravan.       Needle Gauge: 18.        Needle Length (Inches): 3.5        Catheter: 20 G.          Catheter threaded easily.             # of attempts: 1 and  # of redirects:  0    Assessment/Narrative         Paresthesias: No.       Test dose of 3 mL lidocaine 1.5% w/ 1:200,000 epinephrine at 02:30 CDT.         Test dose negative, 3 minutes after injection, for signs of intravascular, subdural, or intrathecal injection.       Insertion/Infusion Method: LORT saline and LORT air       Aspiration negative for Heme or CSF via Epidural Catheter.       Sensory Level Left: T10.       Sensory Level Right: T10.    Medication(s) Administered   0.25% Bupivacaine PF (Epidural) - EPIDURAL   10 mL - 9/3/2022 2:38:00 AM  Medication Administration Time: 9/3/2022 2:20 AM     Comments:  Called to room for labor epidural  Consent obtained. Risks of headache, back pain, nerve injury, and failure discussed. Alternatives discussed.  Patient sitting up  Epidural performed  Test dose  negative  RN in room during entire procedure  Patient tolerated procedure, placed in left uterine position by RN, FHT stable post procedure

## 2022-09-03 NOTE — PROGRESS NOTES
Notified by LUCAS Leach that pt began having recurrent variable and some early decels beginning about 2 hours ago. Cervical exam at 0618 per RN was 4-5 / 80% / -1. FHT has good recovery of variability between recurrent decels, but they have grown deeper. Baseline fetal . Tocometer showing contractions q1-3 minutes with some coupling. Pitocin up to 8 mL/hr.    Spoke with Dr. Prather - plan to halve pitocin to 4 mL/hr, give fluid bolus, change positions and monitor.    Alondra Prather MD FACOG  [facility]  [phone]

## 2022-09-03 NOTE — PROGRESS NOTES
"[facility] Labor and Delivery Progress Note    Mellissa Prabhakar MRN# [id number]   Age: 22 year old YOB: 2000           Subjective:   Patient with significant discomfort with contractions. Received hydroxyzine 50 mg and morphine 5 mg; would like epidural.           Objective:     Patient Vitals for the past 24 hrs:   BP Temp Temp src Pulse Resp Height Weight BMI (Calculated) Oximeter Heart Rate   22 2116 122/81 98.7  F (37.1  C) Oral 88 16 -- -- -- 88 bpm   22 0925 -- -- -- -- -- 1.448 m (4' 9\") 46.3 kg (102 lb) 22.07 --         Cervical Exam:  / 80% / -1 per RN     Position: Posterior    Membranes:   SROM    Fetal Heart Rate:    Variability: moderate (amplitude range 6 to 25 bpm)    Baseline Rate: normal range    Accelerations present   No decelerations   Fetal Heart Rate Tracing: category 1    Tocometer showing contractions q2-5 min          Assessment:   Mellissa Prabhakar is a 22 year old  who is 37w2d here with SROM, no evidence of active labor, GBS neg.          Plan:   - Epidural  - Stop Cytotec  - Start pitocin augmentation      Desiree Hickman MD PGY-1    Alondra Prather MD FACOG  [facility]  [phone]      "

## 2022-09-03 NOTE — ANESTHESIA PREPROCEDURE EVALUATION
Anesthesia Pre-Procedure Evaluation    Patient: Mellissa Prabhakar   MRN: [id number] : 2000        Procedure :           History reviewed. No pertinent past medical history.   History reviewed. No pertinent surgical history.   No Known Allergies   Social History     Tobacco Use     Smoking status: Never Smoker     Smokeless tobacco: Never Used   Substance Use Topics     Alcohol use: Never      Wt Readings from Last 1 Encounters:   22 46.3 kg (102 lb)        Anesthesia Evaluation            ROS/MED HX  ENT/Pulmonary:  - neg pulmonary ROS     Neurologic:  - neg neurologic ROS     Cardiovascular:  - neg cardiovascular ROS     METS/Exercise Tolerance:     Hematologic:  - neg hematologic  ROS     Musculoskeletal:  - neg musculoskeletal ROS     GI/Hepatic:  - neg GI/hepatic ROS     Renal/Genitourinary:  - neg Renal ROS     Endo:  - neg endo ROS     Psychiatric/Substance Use:  - neg psychiatric ROS     Infectious Disease:  - neg infectious disease ROS     Malignancy:  - neg malignancy ROS     Other:      (+) Possibly pregnant, ,         Physical Exam    Airway  airway exam normal      Mallampati: II       Respiratory Devices and Support         Dental  no notable dental history         Cardiovascular   cardiovascular exam normal       Rhythm and rate: regular and normal     Pulmonary   pulmonary exam normal        breath sounds clear to auscultation           OUTSIDE LABS:  CBC:   Lab Results   Component Value Date    WBC 6.9 2022    WBC 6.0 2021    HGB 13.6 2022    HGB 12.7 2022    HCT 37.3 2022    HCT 40.4 2021     2022     2022     BMP:   Lab Results   Component Value Date     2022     2021    POTASSIUM 3.5 2022    POTASSIUM 4.0 2021    CHLORIDE 106 2022    CHLORIDE 103 2021    CO2 23 2022    CO2 28 2021    BUN 6 (L) 2022    BUN 10 2021    CR 0.59 (L) 2022    CR 0.84  02/17/2021    GLC 78 01/29/2022    GLC 75 02/17/2021     COAGS: No results found for: PTT, INR, FIBR  POC:   Lab Results   Component Value Date    HCG Positive (A) 01/18/2022     HEPATIC:   Lab Results   Component Value Date    ALBUMIN 4.5 01/29/2022    PROTTOTAL 7.6 01/29/2022    ALT 11 01/29/2022    AST 13 01/29/2022    ALKPHOS 28 (L) 01/29/2022    BILITOTAL 1.1 (H) 01/29/2022     OTHER:   Lab Results   Component Value Date    YUNG 9.4 01/29/2022    TSH 1.53 02/17/2021    SED 5 02/17/2021       Anesthesia Plan    ASA Status:  2      Anesthesia Type: Epidural.              Consents    Anesthesia Plan(s) and associated risks, benefits, and realistic alternatives discussed. Questions answered and patient/representative(s) expressed understanding.    - Discussed:     - Discussed with:  Patient      - Extended Intubation/Ventilatory Support Discussed: No.      - Patient is DNR/DNI Status: No    Use of blood products discussed: No .     Postoperative Care            Comments:    Other Comments: Labor epidural            Anshu Clark MD

## 2022-09-03 NOTE — PROGRESS NOTES
Good btb but variables so iupc placed and pit off and will do amnioinfusion and scalp electrode placed also

## 2022-09-04 LAB
ERYTHROCYTE [DISTWIDTH] IN BLOOD BY AUTOMATED COUNT: 12.1 % (ref 10–15)
HCT VFR BLD AUTO: 27.8 % (ref 35–47)
HGB BLD-MCNC: 9.7 G/DL (ref 11.7–15.7)
MCH RBC QN AUTO: 31 PG (ref 26.5–33)
MCHC RBC AUTO-ENTMCNC: 34.9 G/DL (ref 31.5–36.5)
MCV RBC AUTO: 89 FL (ref 78–100)
PLATELET # BLD AUTO: 124 10E3/UL (ref 150–450)
RBC # BLD AUTO: 3.13 10E6/UL (ref 3.8–5.2)
WBC # BLD AUTO: 19.7 10E3/UL (ref 4–11)

## 2022-09-04 PROCEDURE — 250N000011 HC RX IP 250 OP 636

## 2022-09-04 PROCEDURE — 258N000003 HC RX IP 258 OP 636

## 2022-09-04 PROCEDURE — 85027 COMPLETE CBC AUTOMATED: CPT

## 2022-09-04 PROCEDURE — 250N000013 HC RX MED GY IP 250 OP 250 PS 637

## 2022-09-04 PROCEDURE — 250N000013 HC RX MED GY IP 250 OP 250 PS 637: Performed by: OBSTETRICS & GYNECOLOGY

## 2022-09-04 PROCEDURE — 36415 COLL VENOUS BLD VENIPUNCTURE: CPT

## 2022-09-04 PROCEDURE — 120N000001 HC R&B MED SURG/OB

## 2022-09-04 RX ORDER — DOCUSATE SODIUM 100 MG/1
100 CAPSULE, LIQUID FILLED ORAL 2 TIMES DAILY
Status: DISCONTINUED | OUTPATIENT
Start: 2022-09-04 | End: 2022-09-05 | Stop reason: HOSPADM

## 2022-09-04 RX ADMIN — GENTAMICIN SULFATE 70 MG: 40 INJECTION, SOLUTION INTRAMUSCULAR; INTRAVENOUS at 09:06

## 2022-09-04 RX ADMIN — AMPICILLIN SODIUM 2 G: 2 INJECTION, POWDER, FOR SOLUTION INTRAMUSCULAR; INTRAVENOUS at 22:15

## 2022-09-04 RX ADMIN — GENTAMICIN SULFATE 70 MG: 40 INJECTION, SOLUTION INTRAMUSCULAR; INTRAVENOUS at 00:04

## 2022-09-04 RX ADMIN — AMPICILLIN SODIUM 2 G: 2 INJECTION, POWDER, FOR SOLUTION INTRAMUSCULAR; INTRAVENOUS at 04:18

## 2022-09-04 RX ADMIN — AMPICILLIN SODIUM 2 G: 2 INJECTION, POWDER, FOR SOLUTION INTRAMUSCULAR; INTRAVENOUS at 16:20

## 2022-09-04 RX ADMIN — IBUPROFEN 800 MG: 800 TABLET ORAL at 18:49

## 2022-09-04 RX ADMIN — DOCUSATE SODIUM 100 MG: 100 CAPSULE, LIQUID FILLED ORAL at 09:05

## 2022-09-04 RX ADMIN — IBUPROFEN 800 MG: 800 TABLET ORAL at 11:51

## 2022-09-04 RX ADMIN — DOCUSATE SODIUM 100 MG: 100 CAPSULE, LIQUID FILLED ORAL at 21:12

## 2022-09-04 RX ADMIN — IBUPROFEN 800 MG: 800 TABLET ORAL at 04:24

## 2022-09-04 RX ADMIN — AMPICILLIN SODIUM 2 G: 2 INJECTION, POWDER, FOR SOLUTION INTRAMUSCULAR; INTRAVENOUS at 10:17

## 2022-09-04 RX ADMIN — GENTAMICIN SULFATE 70 MG: 40 INJECTION, SOLUTION INTRAMUSCULAR; INTRAVENOUS at 17:16

## 2022-09-04 ASSESSMENT — ACTIVITIES OF DAILY LIVING (ADL)
ADLS_ACUITY_SCORE: 18

## 2022-09-04 NOTE — PROGRESS NOTES
Attended delivery, baby brought to warmer crying with good pink color. No interventions required. NNP excused me. Baby left with RN for further cares.

## 2022-09-04 NOTE — PROGRESS NOTES
Pt assisted out of bed to bathroom, unable to void at this time. Pt c/o dizziness and nausea and was transferred to  room 2114 via wheelchair.

## 2022-09-04 NOTE — PROGRESS NOTES
"Vaginal Delivery Postpartum    Patient Name:  Mellissa Prabhakar  :      2000  MRN:      [id number]      Assessment:  IUP at 37 weeks, delivered. Normal postpartum course.    Plan:  Continue current cares.  Home tomorrow    Subjective:  The patient feels tired, requests stool softener. She is voiding without difficulty, and lochia is normal. Pain is well controlled with current medications.     Objective:  BP 95/62 (BP Location: Right arm, Patient Position: Semi-Manrique's, Cuff Size: Adult Regular)   Pulse 106   Temp 98.4  F (36.9  C) (Oral)   Resp 16   Ht 1.448 m (4' 9\")   Wt 46.3 kg (102 lb)   LMP 12/15/2021   SpO2 98%   Breastfeeding Unknown   BMI 22.07 kg/m      The uterine fundus is firm at U-1, and nontender.  LEs  = normal, no edema.    Alondra Prather MD FACOG  [facility]  [phone]    Date:  2022  Time:  8:10 AM    Patient Name:  Mellissa Prabhakar  :  2000  MRN:  [id number]  "

## 2022-09-04 NOTE — ANESTHESIA POSTPROCEDURE EVALUATION
Patient: Mellissa Prabhakar    Procedure: * No procedures listed *       Anesthesia Type:  Epidural    Note:  Disposition: Inpatient   Postop Pain Control: Uneventful            Sign Out: Well controlled pain   PONV: No   Neuro/Psych: Uneventful            Sign Out: Acceptable/Baseline neuro status   Airway/Respiratory: Uneventful            Sign Out: Acceptable/Baseline resp. status   CV/Hemodynamics: Uneventful            Sign Out: Acceptable CV status; No obvious hypovolemia; No obvious fluid overload   Other NRE: NONE   DID A NON-ROUTINE EVENT OCCUR? No    Event details/Postop Comments:  No apparent complications from epidural.           Last vitals:  Vitals:    09/03/22 2300 09/04/22 0300 09/04/22 0820   BP: 96/53 95/62 (!) 86/50   Pulse:  106 98   Resp:  16 16   Temp:  36.9  C (98.4  F) 36.7  C (98  F)   SpO2:  98% 97%       Electronically Signed By: Desiree Burks MD  September 4, 2022  10:35 AM

## 2022-09-04 NOTE — OP NOTE
Procedure Date: 2022    HISTORY:  The patient is a 22-year-old  1, para 0, at 37 and 3/7 weeks, followed by Dr. Marcelino for prenatal care.  History and physical unchanged from prenatal course.    FIRST STAGE:  The patient was admitted to labor and delivery on 2022.  She had a spontaneous rupture of membranes on 2022 at 7 a.m. and was having some contractions, was given Cervidil and then Pitocin augmentation was started.  The patient's fetal heart tones were good.  Epidural was placed.  Pitocin was increased accordingly.  She did develop some variables.  Therefore, an IUPC and amnioinfusion were performed, and fetal heart tones were down to good and the Pitocin was restarted and she eventually went to complete at 1558 on 2022.    SECOND STAGE:  A male infant was delivered on 2022, weighing 5 pounds 5.7 ounces with Apgars of 8 and 9 and delivered at  over an intact perineum.  Fetal heart tones were good with pushing.    THIRD STAGE:  Intact placenta delivered on 2022 at .  Bladder was emptied.  Uterus firmed up nicely.  She had a second-degree perineal laceration, repaired with 3-0 chromic in layers.  Rectal sphincter intact.    ESTIMATED BLOOD LOSS:  325 mL    FINAL DIAGNOSIS:  Intrauterine pregnancy at 37.5 weeks, delivered.  1.  Male infant delivered on 2022, weighing 5 pounds 5.7 ounces with Apgars 8 and 9.  2.  External fetal monitoring.  3.  Epidural.  4.  IUPC.  5.  Amnioinfusion.  6.  Variables resolved.  7.  Midline second-degree perineal laceration repaired.    Crystal Fitzpatrick MD        D: 2022   T: 2022   MT: rima    Name:     KOBE QUIROGA  MRN:      [id number]        Account:        [id number]   :      2000           Procedure Date: 2022     Document: [id number]    cc:  Neelima Márquez

## 2022-09-04 NOTE — PROGRESS NOTES
Writer notified by LUCAS Leach that sepsis protocol was triggered because of  and BP 95/62. Lactic acid is not required to be ordered as pt is already on appropriate broad spectrum abx x 24 hrs postpartum and prenatal blood pressures are in the same range. Stool softener also ordered per pt request.

## 2022-09-05 VITALS
WEIGHT: 102 LBS | HEIGHT: 57 IN | OXYGEN SATURATION: 99 % | RESPIRATION RATE: 16 BRPM | BODY MASS INDEX: 22.01 KG/M2 | TEMPERATURE: 97.7 F | DIASTOLIC BLOOD PRESSURE: 70 MMHG | SYSTOLIC BLOOD PRESSURE: 106 MMHG | HEART RATE: 80 BPM

## 2022-09-05 PROCEDURE — 250N000013 HC RX MED GY IP 250 OP 250 PS 637

## 2022-09-05 PROCEDURE — 250N000013 HC RX MED GY IP 250 OP 250 PS 637: Performed by: OBSTETRICS & GYNECOLOGY

## 2022-09-05 RX ADMIN — IBUPROFEN 800 MG: 800 TABLET ORAL at 17:14

## 2022-09-05 RX ADMIN — DOCUSATE SODIUM 100 MG: 100 CAPSULE, LIQUID FILLED ORAL at 08:21

## 2022-09-05 RX ADMIN — IBUPROFEN 800 MG: 800 TABLET ORAL at 08:20

## 2022-09-05 RX ADMIN — IBUPROFEN 800 MG: 800 TABLET ORAL at 02:04

## 2022-09-05 ASSESSMENT — ACTIVITIES OF DAILY LIVING (ADL)
ADLS_ACUITY_SCORE: 18

## 2022-09-05 NOTE — PLAN OF CARE
Goal Outcome Evaluation:           Patient discharged to home with baby in carseat.  Reviewed discharge instructions and education.

## 2022-09-05 NOTE — DISCHARGE SUMMARY
[facility] Discharge Summary    Mellissa Prabhakar MRN# [id number]   Age: 22 year old YOB: 2000     Date of Admission:  2022  Date of Discharge::  2022  Admitting Physician:  Alondra Prather MD  Discharge Physician:  Crystal Fitzpatrick MD     [facility]          Admission Diagnoses:   Encounter for triage in pregnant patient [Z36.89]  Normal labor [O80, Z37.9]          Discharge Diagnosis:   Intrauterine pregnancy at 37 weeks gestation          Procedures:   Procedure(s):        No procedures performed during this admission           Medications Prior to Admission:     Medications Prior to Admission   Medication Sig Dispense Refill Last Dose     fluocinonide (LIDEX) 0.05 % external cream Apply topically 2 times daily To affected area on hands and feet for 2 weeks. Tapering with improvement and restarting with flares. 60 g 2      metoclopramide (REGLAN) 5 MG tablet Take 1 tablet (5 mg) by mouth 3 times daily as needed (nausea) 15 tablet 0              Discharge Medications:     Current Facility-Administered Medications   Medication     docusate sodium (COLACE) capsule 100 mg     ibuprofen (ADVIL/MOTRIN) tablet 800 mg     ketorolac (TORADOL) injection 15 mg    Or     ketorolac (TORADOL) injection 15 mg    Or     ibuprofen (ADVIL/MOTRIN) tablet 800 mg     lanolin cream     methylergonovine (METHERGINE) injection 200 mcg     misoprostol (CYTOTEC) tablet 400 mcg    Or     misoprostol (CYTOTEC) tablet 800 mcg     nalbuphine (NUBAIN) injection 2.5-5 mg     No MMR Needed - Assessment: Patient does not need MMR vaccine     No Tdap Needed - Assessment: Patient does not need Tdap vaccine     oxytocin (PITOCIN) 30 units in 500 mL 0.9% NaCl infusion     oxytocin (PITOCIN) 30 units in 500 mL 0.9% NaCl infusion     oxytocin (PITOCIN) injection 10 Units     oxytocin (PITOCIN) injection 10 Units     tranexamic acid (CYKLOKAPRON) bolus 1 g vial attach to NaCl 50  or 100 mL bag ADULT     witch hazel-glycerin (TUCKS) pad             Consultations:   No consultations were requested during this admission          Brief History of Labor:    and chorio           Hospital Course:   The patient's hospital course was unremarkable.  On discharge, her pain was well controlled. Vaginal bleeding is similar to peak menstrual flow.  Voiding without difficulty.  Ambulating well and tolerating a normal diet.  No fever.  Breastfeeding well.  Infant is stable.  No bowel movement yet.  She was discharged on post-partum day #2.    Post-partum hemoglobin:   Hemoglobin   Date Value Ref Range Status   2022 9.7 (L) 11.7 - 15.7 g/dL Final     Comment:     This result has been called to SAGAR RIVERA by Eda Sanchez on 2022 at 0910, and has not been read back.              Discharge Instructions and Follow-Up:   Discharge diet: Regular   Discharge activity: No sex for 6 week(s)   Discharge follow-up: Follow up with primary care provider in 6 weeks   Wound care: Ice to area for comfort           Discharge Disposition:   Discharged to home      Attestation:  I have reviewed today's vital signs, notes, medications, labs and imaging.    Crystal Fitzpatrick MD

## 2022-09-09 ENCOUNTER — MEDICAL CORRESPONDENCE (OUTPATIENT)
Dept: HEALTH INFORMATION MANAGEMENT | Facility: CLINIC | Age: 22
End: 2022-09-09

## 2022-09-25 ENCOUNTER — HEALTH MAINTENANCE LETTER (OUTPATIENT)
Age: 22
End: 2022-09-25

## 2022-10-04 ENCOUNTER — MEDICAL CORRESPONDENCE (OUTPATIENT)
Dept: HEALTH INFORMATION MANAGEMENT | Facility: CLINIC | Age: 22
End: 2022-10-04

## 2022-11-15 ENCOUNTER — MEDICAL CORRESPONDENCE (OUTPATIENT)
Dept: HEALTH INFORMATION MANAGEMENT | Facility: CLINIC | Age: 22
End: 2022-11-15

## 2022-12-05 ENCOUNTER — LAB REQUISITION (OUTPATIENT)
Dept: LAB | Facility: CLINIC | Age: 22
End: 2022-12-05
Payer: COMMERCIAL

## 2022-12-05 DIAGNOSIS — Z30.9 ENCOUNTER FOR CONTRACEPTIVE MANAGEMENT, UNSPECIFIED: ICD-10-CM

## 2022-12-05 PROCEDURE — 87591 N.GONORRHOEAE DNA AMP PROB: CPT | Mod: ORL | Performed by: OBSTETRICS & GYNECOLOGY

## 2022-12-05 PROCEDURE — 87491 CHLMYD TRACH DNA AMP PROBE: CPT | Mod: ORL | Performed by: OBSTETRICS & GYNECOLOGY

## 2022-12-06 ENCOUNTER — OFFICE VISIT (OUTPATIENT)
Dept: DERMATOLOGY | Facility: CLINIC | Age: 22
End: 2022-12-06
Payer: COMMERCIAL

## 2022-12-06 ENCOUNTER — TELEPHONE (OUTPATIENT)
Dept: DERMATOLOGY | Facility: CLINIC | Age: 22
End: 2022-12-06

## 2022-12-06 DIAGNOSIS — Z51.81 THERAPEUTIC DRUG MONITORING: ICD-10-CM

## 2022-12-06 DIAGNOSIS — L30.9 CHRONIC ECZEMA OF HAND: Primary | ICD-10-CM

## 2022-12-06 DIAGNOSIS — L20.9 ATOPIC DERMATITIS, UNSPECIFIED TYPE: Primary | ICD-10-CM

## 2022-12-06 LAB
ALBUMIN SERPL BCG-MCNC: 4.5 G/DL (ref 3.5–5.2)
ALP SERPL-CCNC: 25 U/L (ref 35–104)
ALT SERPL W P-5'-P-CCNC: 34 U/L (ref 10–35)
ANION GAP SERPL CALCULATED.3IONS-SCNC: 13 MMOL/L (ref 7–15)
AST SERPL W P-5'-P-CCNC: 25 U/L (ref 10–35)
BILIRUB SERPL-MCNC: 0.8 MG/DL
BUN SERPL-MCNC: 8.7 MG/DL (ref 6–20)
CALCIUM SERPL-MCNC: 9.8 MG/DL (ref 8.6–10)
CHLORIDE SERPL-SCNC: 103 MMOL/L (ref 98–107)
CREAT SERPL-MCNC: 0.58 MG/DL (ref 0.51–0.95)
DEPRECATED HCO3 PLAS-SCNC: 21 MMOL/L (ref 22–29)
ERYTHROCYTE [DISTWIDTH] IN BLOOD BY AUTOMATED COUNT: 13.3 % (ref 10–15)
GFR SERPL CREATININE-BSD FRML MDRD: >90 ML/MIN/1.73M2
GLUCOSE SERPL-MCNC: 82 MG/DL (ref 70–99)
HCT VFR BLD AUTO: 40.5 % (ref 35–47)
HGB BLD-MCNC: 13.6 G/DL (ref 11.7–15.7)
MCH RBC QN AUTO: 29.2 PG (ref 26.5–33)
MCHC RBC AUTO-ENTMCNC: 33.6 G/DL (ref 31.5–36.5)
MCV RBC AUTO: 87 FL (ref 78–100)
PLATELET # BLD AUTO: 165 10E3/UL (ref 150–450)
POTASSIUM SERPL-SCNC: 3.9 MMOL/L (ref 3.4–5.3)
PROT SERPL-MCNC: 7.9 G/DL (ref 6.4–8.3)
RBC # BLD AUTO: 4.65 10E6/UL (ref 3.8–5.2)
SODIUM SERPL-SCNC: 137 MMOL/L (ref 136–145)
WBC # BLD AUTO: 5.8 10E3/UL (ref 4–11)

## 2022-12-06 PROCEDURE — 86803 HEPATITIS C AB TEST: CPT | Performed by: PHYSICIAN ASSISTANT

## 2022-12-06 PROCEDURE — 85027 COMPLETE CBC AUTOMATED: CPT | Performed by: PHYSICIAN ASSISTANT

## 2022-12-06 PROCEDURE — 86481 TB AG RESPONSE T-CELL SUSP: CPT | Performed by: PHYSICIAN ASSISTANT

## 2022-12-06 PROCEDURE — 80053 COMPREHEN METABOLIC PANEL: CPT | Performed by: PHYSICIAN ASSISTANT

## 2022-12-06 PROCEDURE — 87340 HEPATITIS B SURFACE AG IA: CPT | Performed by: PHYSICIAN ASSISTANT

## 2022-12-06 PROCEDURE — 99214 OFFICE O/P EST MOD 30 MIN: CPT | Performed by: PHYSICIAN ASSISTANT

## 2022-12-06 PROCEDURE — 36415 COLL VENOUS BLD VENIPUNCTURE: CPT | Performed by: PHYSICIAN ASSISTANT

## 2022-12-06 RX ORDER — DUPILUMAB 300 MG/2ML
300 INJECTION, SOLUTION SUBCUTANEOUS
Qty: 14 ML | Refills: 1 | OUTPATIENT
Start: 2022-12-06 | End: 2022-12-20

## 2022-12-06 NOTE — LETTER
12/6/2022         RE: Mellissa Prabhakar  [address]  [place] [address]        Dear Colleague,    Thank you for referring your patient, Mellissa Prabhakar, to the [facility]. Please see a copy of my visit note below.    HPI:   Chief complaints: Mellissa Prabhakar is a pleasant 22 year old female who presents for recheck hand and foot eczema. She has been using fluocinonide for the past 6 months, which helps a little but never clears her. She is getting painful cracking and fissuring of her hands. She is interested in alternative treatments      PHYSICAL EXAM:    There were no vitals taken for this visit.  Skin exam performed as follows: Type 4 skin. Mood appropriate  Alert and Oriented X 3. Well developed, well nourished in no distress.  General appearance: Normal  Head including face: Normal  Eyes: conjunctiva and lids: Normal  Mouth: Lips, teeth, gums: Normal  Neck: Normal  Cardiovascular: Exam of peripheral vascular system by observation for swelling, varicosities, edema: Normal  Right upper extremity: Normal  Left upper extremity: Normal  Right lower extremity: Normal  Left lower extremity: Normal  Skin: Scalp and body hair: See below    Cracking, xerosis and eczematous dermatitis on bilateral palms; few areas on bilateral feet    ASSESSMENT/PLAN:     1. Chronic hand eczema - discussed Opzelura, vs Eucrisa; she would prefer to avoid creams as this is difficult with her baby. Discussed dupixent and she would like to try this.   --Check labs today  --Start Dupixent          Follow-up: 2-3 months  CC:   Scribed By: Desiree Chew, MS, PAEsterC          Again, thank you for allowing me to participate in the care of your patient.        Sincerely,        Desiree Chew PA-C

## 2022-12-06 NOTE — TELEPHONE ENCOUNTER
PA Initiation    Medication: Dupixent PA Initiated  Insurance Company: [employer] ([facility]) - Phone [phone] Fax [phone]  Pharmacy Filling the Rx:    Filling Pharmacy Phone:    Filling Pharmacy Fax:    Start Date: 12/6/2022        Flor [facility] Lia[facility]  [email or web address]  Phone: [phone]  Fax: [phone]

## 2022-12-06 NOTE — PROGRESS NOTES
HPI:   Chief complaints: Mellissa Prabhakar is a pleasant 22 year old female who presents for recheck hand and foot eczema. She has been using fluocinonide for the past 6 months, which helps a little but never clears her. She is getting painful cracking and fissuring of her hands. She is interested in alternative treatments      PHYSICAL EXAM:    There were no vitals taken for this visit.  Skin exam performed as follows: Type 4 skin. Mood appropriate  Alert and Oriented X 3. Well developed, well nourished in no distress.  General appearance: Normal  Head including face: Normal  Eyes: conjunctiva and lids: Normal  Mouth: Lips, teeth, gums: Normal  Neck: Normal  Cardiovascular: Exam of peripheral vascular system by observation for swelling, varicosities, edema: Normal  Right upper extremity: Normal  Left upper extremity: Normal  Right lower extremity: Normal  Left lower extremity: Normal  Skin: Scalp and body hair: See below    Cracking, xerosis and eczematous dermatitis on bilateral palms; few areas on bilateral feet    ASSESSMENT/PLAN:     1. Chronic hand eczema - discussed Opzelura, vs Eucrisa; she would prefer to avoid creams as this is difficult with her baby. Discussed dupixent and she would like to try this.   --Check labs today  --Start Dupixent          Follow-up: 2-3 months  CC:   Scribed By: Desiree Sanford MS, PAEDWARD

## 2022-12-07 LAB
C TRACH DNA SPEC QL PROBE+SIG AMP: NEGATIVE
GAMMA INTERFERON BACKGROUND BLD IA-ACNC: 0.09 IU/ML
HBV SURFACE AG SERPL QL IA: NONREACTIVE
HCV AB SERPL QL IA: NONREACTIVE
M TB IFN-G BLD-IMP: NEGATIVE
M TB IFN-G CD4+ BCKGRND COR BLD-ACNC: 9.91 IU/ML
MITOGEN IGNF BCKGRD COR BLD-ACNC: -0.01 IU/ML
MITOGEN IGNF BCKGRD COR BLD-ACNC: -0.03 IU/ML
N GONORRHOEA DNA SPEC QL NAA+PROBE: NEGATIVE
QUANTIFERON MITOGEN: 10 IU/ML
QUANTIFERON NIL TUBE: 0.09 IU/ML
QUANTIFERON TB1 TUBE: 0.06 IU/ML
QUANTIFERON TB2 TUBE: 0.08

## 2022-12-07 RX ORDER — PREDNISONE 10 MG/1
TABLET ORAL
Qty: 15 TABLET | Refills: 0 | Status: SHIPPED | OUTPATIENT
Start: 2022-12-07

## 2022-12-07 NOTE — TELEPHONE ENCOUNTER
Please let patient know. Does she want to try a short course of oral steroids. Her insurance is requiring this. Typically for eczema, it works temporarily but then the skin disease returns. We can try it - it may help but more than likely her eczema will return. I'll pend if she wants to try.   Have her update us once done with the course and we can re-submit for dupixent if she's not better

## 2022-12-07 NOTE — TELEPHONE ENCOUNTER
PRIOR AUTHORIZATION DENIED    Medication: Dupixent PA Denied    Denial Date: 12/6/2022    Denial Rational:     Per your health plan's criteria, this drug is covered if you meet the following:    You have tried or cannot use one of the following:  (1) An oral corticosteroid: Dexamethasone oral tablet, Methylprednisolone oral tablet.  (2) A topical calcineurin inhibitor (tacrolimus or pimecrolimus).  The information provided does not show that you meet the criteria listed above.    Appeal Information:                     Appeal options routed to provider.    Flor [facility]sey [facility]ia[facility]  [email or web address]  Phone: [phone]  Fax: [phone]

## 2022-12-07 NOTE — TELEPHONE ENCOUNTER
Called and discussed with pt, she wants to try the pred. Sent to pharmacy and asked pt to reach out to us after the course to let us know how she is doing. Pt voiced understanding.    Brenda JOHN RN  Dermatology   [phone]

## 2022-12-20 ENCOUNTER — TELEPHONE (OUTPATIENT)
Dept: DERMATOLOGY | Facility: CLINIC | Age: 22
End: 2022-12-20

## 2022-12-20 NOTE — TELEPHONE ENCOUNTER
Prior Authorization Specialty Medication Request    Medication/Dose: Dupixent  ICD code (if different than what is on RX):    Previously Tried and Failed:      Important Lab Values:   Rationale:     Insurance Name:   Insurance ID:   Insurance Phone Number:     Pharmacy Information (if different than what is on R  Name:    Phone:

## 2022-12-21 ENCOUNTER — TELEPHONE (OUTPATIENT)
Dept: DERMATOLOGY | Facility: CLINIC | Age: 22
End: 2022-12-21

## 2022-12-21 DIAGNOSIS — L20.81 ATOPIC NEURODERMATITIS: Primary | ICD-10-CM

## 2022-12-21 NOTE — TELEPHONE ENCOUNTER
PA Initiation    Medication: Dupixent- PA Pending  Insurance Company: [employer] ([facility]) - Phone [phone] Fax [phone]  Pharmacy Filling the Rx:    Filling Pharmacy Phone:    Filling Pharmacy Fax:    Start Date: 12/21/2022

## 2022-12-27 NOTE — TELEPHONE ENCOUNTER
Any word on this yet? Pt states she received a letter from her insurance stating that the PA was denied.    Thank you,  Brenda JOHN RN  Dermatology   [phone]

## 2023-01-02 NOTE — TELEPHONE ENCOUNTER
Dupixent Denied.  Patient must try and fail oral corticosteroid or a topical calcineurin inhibitor.  Full denial letter avail in CMM.  Pertinent details listed.      PRIOR AUTHORIZATION DENIED    Medication: Dupixent- PA Denied    Denial Date: 1/21/2023    Denial Rational:       Appeal Information:

## 2023-01-03 RX ORDER — TACROLIMUS 1 MG/G
OINTMENT TOPICAL
Qty: 60 G | Refills: 5 | Status: SHIPPED | OUTPATIENT
Start: 2023-01-03

## 2023-01-03 NOTE — TELEPHONE ENCOUNTER
Please let patient know her insurance is requiring her to try another cream first.     Order pended. Ok to send to pharmacy of choice.

## 2023-01-03 NOTE — TELEPHONE ENCOUNTER
Med sent to pharmacy. [employer] message sent to krissy.    Brenda JOHN RN  Dermatology   [phone]

## 2023-01-03 NOTE — TELEPHONE ENCOUNTER
Closing this encounter as another PA was done.        Thank you,  Brenda JOHN RN  Dermatology   [phone]

## 2023-01-17 ENCOUNTER — TELEPHONE (OUTPATIENT)
Dept: DERMATOLOGY | Facility: CLINIC | Age: 23
End: 2023-01-17
Payer: COMMERCIAL

## 2023-01-17 NOTE — TELEPHONE ENCOUNTER
Prior Authorization Specialty Medication Request    Medication/Dose: Tacrolimus (PROTOPIC) 0.1 % external ointment  ICD code (if different than what is on RX):    Previously Tried and Failed:      Important Lab Values:   Rationale:     Insurance Name:   Insurance ID:   Insurance Phone Number:     Pharmacy Information (if different than what is on RX)  Name:    Phone:

## 2023-01-17 NOTE — TELEPHONE ENCOUNTER
PA needed for med, will start now, see TE 1/17/23.    Patient notified that we are starting a PA and that is why the med is not available yet.    Brenda JOHN RN  Dermatology   [phone]

## 2023-01-17 NOTE — TELEPHONE ENCOUNTER
[facility] Call Center    Phone Message    May a detailed message be left on voicemail: no     Reason for Call: Medication Question or concern regarding medication   Prescription Clarification  Name of Medication: tacrolimus (PROTOPIC) 0.1 % external ointment  Prescribing Provider: Claribel   Pharmacy: [facility][id number] - [place] [[address] ph:[phone]   What on the order needs clarification? Pharmacy did not receive script per Pt          Action Taken: Message routed to:  [facility] DERM    Travel Screening: Not Applicable

## 2023-01-20 NOTE — TELEPHONE ENCOUNTER
Central Prior Authorization Team   Phone: [phone]      PA Initiation    Medication: Tacrolimus (PROTOPIC) 0.1 % external ointment  Insurance Company: Other (see comments)Comment:  [employer] Medicaid  Pharmacy Filling the Rx: [facility][id number] - [place] [[place]], [address]  Filling Pharmacy Phone: [phone]  Filling Pharmacy Fax:    Start Date: 1/20/2023

## 2023-01-20 NOTE — TELEPHONE ENCOUNTER
Prior Authorization Approval    Authorization Effective Date: 1/20/2023  Authorization Expiration Date: 1/20/2024  Medication: Tacrolimus (PROTOPIC) 0.1 % external ointment-APPROVED  Approved Dose/Quantity:   Reference #:     Insurance Company: Other (see comments)Comment:  [employer] Medicaid  Expected CoPay:       CoPay Card Available:      Foundation Assistance Needed:    Which Pharmacy is filling the prescription (Not needed for infusion/clinic administered): [facility][id number] - [place] [[facility], [address]  Pharmacy Notified: Yes  Patient Notified: No  **Instructed pharmacy to notify patient when script is ready to /ship.**

## 2023-01-31 ENCOUNTER — TELEPHONE (OUTPATIENT)
Dept: DERMATOLOGY | Facility: CLINIC | Age: 23
End: 2023-01-31
Payer: COMMERCIAL

## 2023-01-31 NOTE — TELEPHONE ENCOUNTER
Prior Authorization Specialty Medication Request    Medication/Dose: Dupixent  ICD code (if different than what is on RX):    Previously Tried and Failed:    Important Lab Values:   Rationale:     Insurance Name:   Insurance ID:   Insurance Phone Number:     Pharmacy Information (if different than what is on RX)  Name:    Phone:      Pt has now tried topical tacrolimus with no improvement.    Thank you,  Brenda JOHN RN  Dermatology   [phone]

## 2023-01-31 NOTE — TELEPHONE ENCOUNTER
Prior Authorization Approval    Authorization Effective Date: 1/31/2023  Authorization Expiration Date: 7/23/2023  Medication:   Approved Dose/Quantity: 6ml per 28 days  Reference #: Key: [id number]   Insurance Company: [employer] ([facility]) - Phone [phone] Fax [phone]  Expected CoPay: $0     CoPay Card Available:      Foundation Assistance Needed:    Which Pharmacy is filling the prescription (Not needed for infusion/clinic administered): [facility] - [place], [place] - [address]  Pharmacy Notified: Yes  Patient Notified: Yes

## 2023-01-31 NOTE — TELEPHONE ENCOUNTER
PA Initiation    Medication:   Insurance Company: [employer] ([facility]) - Phone [phone] Fax [phone]  Pharmacy Filling the Rx:    Filling Pharmacy Phone:    Filling Pharmacy Fax:    Start Date: 1/31/2023    Key: [id number]

## 2023-02-04 ENCOUNTER — HEALTH MAINTENANCE LETTER (OUTPATIENT)
Age: 23
End: 2023-02-04

## 2023-02-28 ENCOUNTER — OFFICE VISIT (OUTPATIENT)
Dept: DERMATOLOGY | Facility: CLINIC | Age: 23
End: 2023-02-28
Payer: COMMERCIAL

## 2023-02-28 ENCOUNTER — MEDICAL CORRESPONDENCE (OUTPATIENT)
Dept: HEALTH INFORMATION MANAGEMENT | Facility: CLINIC | Age: 23
End: 2023-02-28

## 2023-02-28 DIAGNOSIS — L30.9 CHRONIC ECZEMA OF HAND: Primary | ICD-10-CM

## 2023-02-28 PROCEDURE — 99213 OFFICE O/P EST LOW 20 MIN: CPT | Performed by: PHYSICIAN ASSISTANT

## 2023-02-28 ASSESSMENT — PAIN SCALES - GENERAL: PAINLEVEL: NO PAIN (0)

## 2023-02-28 NOTE — PROGRESS NOTES
HPI:   Chief complaints: Mellissa Prabhakar is a pleasant 23 year old female who presents for recheck hand and foot eczema. She started Dupixent 1 month ago and is doing very well. Her hands and feet are clear. No issues on Dupixent. In the past she failed topical steroids, topical calcineurin inhibitor and oral steroids (trial requested by her insurance).     Shx: will be moving to [place] next month  PHYSICAL EXAM:    Breastfeeding No   Skin exam performed as follows: Type 4 skin. Mood appropriate  Alert and Oriented X 3. Well developed, well nourished in no distress.  General appearance: Normal  Head including face: Normal  Eyes: conjunctiva and lids: Normal  Mouth: Lips, teeth, gums: Normal  Neck: Normal  Cardiovascular: Exam of peripheral vascular system by observation for swelling, varicosities, edema: Normal  Right upper extremity: Normal  Left upper extremity: Normal  Right lower extremity: Normal  Left lower extremity: Normal  Skin: Scalp and body hair: See below    Skin on hands clear    ASSESSMENT/PLAN:     1. Chronic hand eczema - doing excellent on Dupixent. No issues with injections. She is happy with results  --Continue Dupixent          Follow-up: Annually/PRN sooner  CC:   Scribed By: Desiree Sanford, MS, PA-C

## 2023-02-28 NOTE — LETTER
2/28/2023         RE: Mellissa Prabhakar  [address]  [place] [address]        Dear Colleague,    Thank you for referring your patient, Mellissa Prabhakar, to the [facility]. Please see a copy of my visit note below.    HPI:   Chief complaints: Mellissa Prabhakar is a pleasant 23 year old female who presents for recheck hand and foot eczema. She started Dupixent 1 month ago and is doing very well. Her hands and feet are clear. No issues on Dupixent. In the past she failed topical steroids, topical calcineurin inhibitor and oral steroids (trial requested by her insurance).     Shx: will be moving to [place] next month  PHYSICAL EXAM:    Breastfeeding No   Skin exam performed as follows: Type 4 skin. Mood appropriate  Alert and Oriented X 3. Well developed, well nourished in no distress.  General appearance: Normal  Head including face: Normal  Eyes: conjunctiva and lids: Normal  Mouth: Lips, teeth, gums: Normal  Neck: Normal  Cardiovascular: Exam of peripheral vascular system by observation for swelling, varicosities, edema: Normal  Right upper extremity: Normal  Left upper extremity: Normal  Right lower extremity: Normal  Left lower extremity: Normal  Skin: Scalp and body hair: See below    Skin on hands clear    ASSESSMENT/PLAN:     1. Chronic hand eczema - doing excellent on Dupixent. No issues with injections. She is happy with results  --Continue Dupixent          Follow-up: Annually/PRN sooner  CC:   Scribed By: Desiree Chew MS, PAEDWARD          Again, thank you for allowing me to participate in the care of your patient.        Sincerely,        Desiree Chew PA-C

## 2024-03-02 ENCOUNTER — HEALTH MAINTENANCE LETTER (OUTPATIENT)
Age: 24
End: 2024-03-02

## 2024-05-15 ENCOUNTER — TELEPHONE (OUTPATIENT)
Dept: DERMATOLOGY | Facility: CLINIC | Age: 24
End: 2024-05-15
Payer: COMMERCIAL

## 2024-05-15 NOTE — TELEPHONE ENCOUNTER
[facility] Call Center    Phone Message    May a detailed message be left on voicemail: yes     Reason for Call: Patient's new provider [facility] has sent a request for medical records over and needs that filled out and sent back. They are doing a PA for a med and need to know what patient has already tried in the past - # [phone] Fax# [phone] Thank you    Action Taken: Other: [place] DERM    Travel Screening: Not Applicable

## 2024-05-16 NOTE — TELEPHONE ENCOUNTER
Called and was transferred to the nurse line- Left message on answering machine for the clinic to fax us a signed release of information to send records. Left our fax number  off [phone].    Thank you,    Juana MACARIORN BSN  [facility]- [phone]

## 2025-03-15 ENCOUNTER — HEALTH MAINTENANCE LETTER (OUTPATIENT)
Age: 25
End: 2025-03-15